# Patient Record
Sex: FEMALE | Race: WHITE | NOT HISPANIC OR LATINO | Employment: OTHER | URBAN - METROPOLITAN AREA
[De-identification: names, ages, dates, MRNs, and addresses within clinical notes are randomized per-mention and may not be internally consistent; named-entity substitution may affect disease eponyms.]

---

## 2019-11-12 ENCOUNTER — TELEPHONE (OUTPATIENT)
Dept: ORTHOPEDICS | Facility: CLINIC | Age: 69
End: 2019-11-12

## 2019-11-12 NOTE — TELEPHONE ENCOUNTER
Called both phone number documented in the chart. Called her regarding making her an appt to see dr. Cem hawley.

## 2019-11-19 ENCOUNTER — TELEPHONE (OUTPATIENT)
Dept: ORTHOPEDICS | Facility: CLINIC | Age: 69
End: 2019-11-19

## 2019-11-19 NOTE — TELEPHONE ENCOUNTER
Attempted to call patient at [phone] to get her schedule. No answer, left a voicemail to call us back.        ----- Message from Joelle Fisher PA-C sent at 11/19/2019  4:01 PM CST -----  Hey I found her number [phone]  ----- Message -----  From: Joelle Fisher PA-C  Sent: 11/19/2019   8:02 AM CST  To: Bernarda King MA    Can you call and schedule her we need to see her at [facility] so I can order the injections.   Thanks  ----- Message -----  From: Joelle Fisher PA-C  Sent: 10/10/2019  12:01 PM CST  To: Joelle Fisher PA-C    Call patient to schedule, make sure records are received.   She wants to restart visco

## 2019-11-19 NOTE — TELEPHONE ENCOUNTER
Attempted to call both phone numbers patient to get her scheduled with Dr. Cem Sarah or with Joelle. Both numbers had no answer and no voicemail.

## 2019-12-06 ENCOUNTER — TELEPHONE (OUTPATIENT)
Dept: ORTHOPEDICS | Facility: CLINIC | Age: 69
End: 2019-12-06

## 2019-12-06 NOTE — TELEPHONE ENCOUNTER
Attempted to call patient to get her scheduled with Joelle. No answer, left voicemail to call back to schedule.    ----- Message from Joelle Fisher PA-C sent at 12/6/2019  3:35 PM CST -----  Please contact patient and schedule for follow up

## 2020-09-14 ENCOUNTER — OFFICE VISIT (OUTPATIENT)
Dept: ORTHOPEDICS | Facility: CLINIC | Age: 70
End: 2020-09-14
Payer: MEDICARE

## 2020-09-14 ENCOUNTER — HOSPITAL ENCOUNTER (OUTPATIENT)
Dept: RADIOLOGY | Facility: HOSPITAL | Age: 70
Discharge: HOME | End: 2020-09-14
Attending: FAMILY MEDICINE
Payer: MEDICARE

## 2020-09-14 VITALS
DIASTOLIC BLOOD PRESSURE: 73 MMHG | HEIGHT: 67 IN | SYSTOLIC BLOOD PRESSURE: 134 MMHG | BODY MASS INDEX: 37.98 KG/M2 | HEART RATE: 71 BPM | WEIGHT: 242 LBS

## 2020-09-14 DIAGNOSIS — M25.572 LEFT ANKLE PAIN, UNSPECIFIED CHRONICITY: ICD-10-CM

## 2020-09-14 DIAGNOSIS — S93.402A SPRAIN OF LEFT ANKLE, UNSPECIFIED LIGAMENT, INITIAL ENCOUNTER: Primary | ICD-10-CM

## 2020-09-14 DIAGNOSIS — M25.572 LEFT ANKLE PAIN, UNSPECIFIED CHRONICITY: Primary | ICD-10-CM

## 2020-09-14 DIAGNOSIS — S86.311A STRAIN OF PERONEAL TENDON OF RIGHT FOOT, INITIAL ENCOUNTER: ICD-10-CM

## 2020-09-14 PROCEDURE — 99999 PR PBB SHADOW E&M-EST. PATIENT-LVL III: CPT | Mod: PBBFAC,,, | Performed by: FAMILY MEDICINE

## 2020-09-14 PROCEDURE — 99203 PR OFFICE/OUTPT VISIT, NEW, LEVL III, 30-44 MIN: ICD-10-PCS | Mod: S$PBB,,, | Performed by: FAMILY MEDICINE

## 2020-09-14 PROCEDURE — 99999 PR PBB SHADOW E&M-EST. PATIENT-LVL III: ICD-10-PCS | Mod: PBBFAC,,, | Performed by: FAMILY MEDICINE

## 2020-09-14 PROCEDURE — 99213 OFFICE O/P EST LOW 20 MIN: CPT | Mod: PBBFAC,25 | Performed by: FAMILY MEDICINE

## 2020-09-14 PROCEDURE — 99203 OFFICE O/P NEW LOW 30 MIN: CPT | Mod: S$PBB,,, | Performed by: FAMILY MEDICINE

## 2020-09-14 PROCEDURE — 73610 X-RAY EXAM OF ANKLE: CPT | Mod: TC,LT

## 2020-09-14 PROCEDURE — 73610 XR ANKLE COMPLETE 3 VIEW LEFT: ICD-10-PCS | Mod: 26,LT,, | Performed by: RADIOLOGY

## 2020-09-14 PROCEDURE — 73610 X-RAY EXAM OF ANKLE: CPT | Mod: 26,LT,, | Performed by: RADIOLOGY

## 2020-09-14 RX ORDER — EZETIMIBE 10 MG/1
10 TABLET ORAL DAILY
COMMUNITY
Start: 2020-09-11

## 2020-09-14 RX ORDER — VIT A/VIT C/VIT E/ZINC/COPPER 4296-226
1 CAPSULE ORAL WEEKLY
COMMUNITY

## 2020-09-14 RX ORDER — ANASTROZOLE 1 MG/1
1 TABLET ORAL DAILY
COMMUNITY
Start: 2020-03-02

## 2020-09-14 RX ORDER — ERGOCALCIFEROL 1.25 MG/1
1 CAPSULE ORAL
COMMUNITY
Start: 2020-02-10 | End: 2023-09-12

## 2020-09-14 RX ORDER — FENOFIBRATE 160 MG/1
160 TABLET ORAL DAILY
COMMUNITY
Start: 2020-08-25

## 2020-09-14 RX ORDER — MINERAL OIL
180 ENEMA (ML) RECTAL DAILY
COMMUNITY
Start: 2020-07-13

## 2020-09-14 RX ORDER — HYDROCHLOROTHIAZIDE 25 MG/1
25 TABLET ORAL DAILY
COMMUNITY
Start: 2020-07-25

## 2020-09-14 RX ORDER — LISINOPRIL 10 MG/1
10 TABLET ORAL DAILY
COMMUNITY
Start: 2020-09-11

## 2020-09-14 NOTE — PROGRESS NOTES
Subjective:     Patient ID: Jeana Monsivais is a 69 y.o. female.    Chief Complaint: Pain of the Left Foot and Pain of the Right Lower Leg      HPI:  Problem -left ankle sprain and right lower leg pain , patient tripped.  Was seen at another facility and told she might have a fracture and placed in walking boot but states it was too uncomfortable and she did not continue it more than a day or 2.  She has continued to have bruising but the swelling in the left foot is decreasing and the pain is better and 3.  She however has noticed pain in the lateral right lower leg and thinks that she strained that area at the same time of the initial fall.    Duration - 1 week    Severity -    Pain - 3   Limitations - no longer painful to bear weight for short distances.    Previous Treatment - boot.    Other Information - patient had Visco injections for knee last year which work well.      History reviewed. No pertinent past medical history.  Past Surgical History:   Procedure Laterality Date    BREAST LUMPECTOMY Left 2019    HYSTERECTOMY  1997     Family History   Problem Relation Age of Onset    Heart failure Mother     Heart failure Father     Hypertrophic cardiomyopathy Sister     Hypertrophic cardiomyopathy Brother      Social History     Socioeconomic History    Marital status:      Spouse name: Not on file    Number of children: Not on file    Years of education: Not on file    Highest education level: Not on file   Occupational History    Not on file   Social Needs    Financial resource strain: Not on file    Food insecurity     Worry: Not on file     Inability: Not on file    Transportation needs     Medical: Not on file     Non-medical: Not on file   Tobacco Use    Smoking status: Never Smoker    Smokeless tobacco: Never Used   Substance and Sexual Activity    Alcohol use: Not Currently    Drug use: Never    Sexual activity: Not Currently   Lifestyle    Physical activity     Days per week:  Not on file     Minutes per session: Not on file    Stress: Not on file   Relationships    Social connections     Talks on phone: Not on file     Gets together: Not on file     Attends Voodoo service: Not on file     Active member of club or organization: Not on file     Attends meetings of clubs or organizations: Not on file     Relationship status: Not on file   Other Topics Concern    Not on file   Social History Narrative    Not on file       Current Outpatient Medications:     anastrozole (ARIMIDEX) 1 mg Tab, Take 1 mg by mouth once daily., Disp: , Rfl:     ergocalciferol (ERGOCALCIFEROL) 50,000 unit Cap, Take 1 capsule by mouth., Disp: , Rfl:     ezetimibe (ZETIA) 10 mg tablet, Take 10 mg by mouth once daily., Disp: , Rfl:     fenofibrate 160 MG Tab, Take 160 mg by mouth once daily., Disp: , Rfl:     fexofenadine (ALLEGRA) 180 MG tablet, Take 180 mg by mouth once daily., Disp: , Rfl:     hydroCHLOROthiazide (HYDRODIURIL) 25 MG tablet, Take 25 mg by mouth once daily., Disp: , Rfl:     lisinopriL 10 MG tablet, Take 10 mg by mouth once daily., Disp: , Rfl:     vitamins  A,C,E-zinc-copper (PRESERVISION AREDS) 14,320-226-200 unit-mg-unit Cap, Take 1 tablet by mouth once a week., Disp: , Rfl:   Review of patient's allergies indicates:   Allergen Reactions    Erythromycin     Mycinette [cetylpyridinium-benzocaine] Diarrhea    Levaquin [levofloxacin] Nausea And Vomiting     Review of Systems   Constitutional: Negative for chills, fever and weight loss.   Respiratory: Negative for shortness of breath.    Cardiovascular: Negative for chest pain and palpitations.       Objective:   Body mass index is 38.47 kg/m².  Vitals:    09/14/20 1353   BP: 134/73   Pulse: 71           Ortho/SPM Exam  General - A&O, NAD.     Respiratory Effort - Normal    Extremity (Body Part) - left ankle     -No acute deformity/swelling-lateral malleolus swelling of 2+ with bruising of the ft.  No tenderness to palpation    ROM -  full    TTP - none    Stability -good and negative drawer sign    Strength - for 5    Neurovascular Intact -     Other -right lower leg-mild diffuse tenderness to palpation along the peroneus muscle with full range of motion and good strength.      Psychiatric - Affect & Cognition WNL      Plan for Improvement -patient has a pool which she will use daily to maintain flexibility and strength.  We will supply her with a lace-up ankle brace.    She should come in for recheck in 3 weeks if not healed by that time.        IMAGING: X-Ray Ankle Complete Left  Narrative: EXAMINATION:  XR ANKLE COMPLETE 3 VIEW LEFT    CLINICAL HISTORY:  Pain in left ankle and joints of left foot    TECHNIQUE:  AP, lateral and oblique views of the left ankle were performed.    COMPARISON:  None    FINDINGS:  No acute osseous or soft tissue abnormality.  Dorsal calcaneal enthesophyte formation noted.  Impression: As above    Electronically signed by: Aguilar Saul MD  Date:    09/14/2020  Time:    14:00       Radiographs / Imaging : Relevant imaging results reviewed by me and interpreted by me, discussed with the patient and / or family -radiographs few by me and no acute dislocation or fracture of the left ankle and agree with some degenerative changes and bones for noted in radiology report.    I reviewed the report from the patient's initial evaluation 1 week ago.  Patient does not desire to go to formal physical therapy so we went over some exercises for range of motion and also the ABC exercises that she can do for further strengthening and endurance of her ankle and proprioception.      Assessment:     Encounter Diagnoses   Name Primary?    Sprain of left ankle, unspecified ligament, initial encounter Yes    Strain of peroneal tendon of right foot, initial encounter         Plan:   Sprain of left ankle, unspecified ligament, initial encounter    Strain of peroneal tendon of right foot, initial encounter        The patient  verbalized good understanding of the medical issues discussed today and expressed appreciation for the care provided.  Patient was given the opportunity to ask questions and be an active participant in their medical care. Patient had no further questions or concerns at this time.     The patient was encouraged to participate in appropriate physical activity.      Roby Sarah M.D.  [facility]        This note was dictated using voice recognition software and may have sound a like errors.

## 2020-09-14 NOTE — PATIENT INSTRUCTIONS
Apply ice to affected area three times a day for (15) fifteen minutes for the next 48 hours, and reduce activity during that time.  Voltaren gel three times a day to affected area if recommended.  Oral medication if recommended.  Physical therapy if recommended at home or at clinic.  Keep recheck visit.    Lace-up ankle splint as needed for the next few weeks    Pool therapy daily or as available    Recheck in 3 weeks if still having soreness or pain

## 2020-10-26 ENCOUNTER — HOSPITAL ENCOUNTER (OUTPATIENT)
Dept: RADIOLOGY | Facility: HOSPITAL | Age: 70
Discharge: HOME | End: 2020-10-26
Attending: PHYSICIAN ASSISTANT
Payer: MEDICARE

## 2020-10-26 ENCOUNTER — OFFICE VISIT (OUTPATIENT)
Dept: ORTHOPEDICS | Facility: CLINIC | Age: 70
End: 2020-10-26
Payer: MEDICARE

## 2020-10-26 DIAGNOSIS — M25.561 RIGHT KNEE PAIN, UNSPECIFIED CHRONICITY: Primary | ICD-10-CM

## 2020-10-26 DIAGNOSIS — M17.11 PRIMARY OSTEOARTHRITIS OF RIGHT KNEE: Primary | ICD-10-CM

## 2020-10-26 DIAGNOSIS — M25.561 RIGHT KNEE PAIN, UNSPECIFIED CHRONICITY: ICD-10-CM

## 2020-10-26 PROCEDURE — 99213 PR OFFICE/OUTPT VISIT, EST, LEVL III, 20-29 MIN: ICD-10-PCS | Mod: S$GLB,,, | Performed by: PHYSICIAN ASSISTANT

## 2020-10-26 PROCEDURE — 73562 X-RAY EXAM OF KNEE 3: CPT | Mod: 26,59,LT, | Performed by: RADIOLOGY

## 2020-10-26 PROCEDURE — 73564 X-RAY EXAM KNEE 4 OR MORE: CPT | Mod: 26,RT,, | Performed by: RADIOLOGY

## 2020-10-26 PROCEDURE — 73562 XR KNEE ORTHO RIGHT WITH FLEXION: ICD-10-PCS | Mod: 26,59,LT, | Performed by: RADIOLOGY

## 2020-10-26 PROCEDURE — 73564 XR KNEE ORTHO RIGHT WITH FLEXION: ICD-10-PCS | Mod: 26,RT,, | Performed by: RADIOLOGY

## 2020-10-26 PROCEDURE — 99213 OFFICE O/P EST LOW 20 MIN: CPT | Mod: S$GLB,,, | Performed by: PHYSICIAN ASSISTANT

## 2020-10-26 PROCEDURE — 99999 PR PBB SHADOW E&M-EST. PATIENT-LVL II: ICD-10-PCS | Mod: PBBFAC,,, | Performed by: PHYSICIAN ASSISTANT

## 2020-10-26 PROCEDURE — 73562 X-RAY EXAM OF KNEE 3: CPT | Mod: TC,LT

## 2020-10-26 PROCEDURE — 99999 PR PBB SHADOW E&M-EST. PATIENT-LVL II: CPT | Mod: PBBFAC,,, | Performed by: PHYSICIAN ASSISTANT

## 2020-10-26 NOTE — PROGRESS NOTES
Patient ID: Jeana Monsivais  YOB: 1950  MRN: [id number]    Chief Complaint: Pain of the Right Knee    Referred By: Sister is current patient    History of Present Illness: Jeana Monsivais is a right-hand dominant 69 y.o. female who is retired. Patient is here today for right knee pain. Patient states her knee pain has been present for about 2 years. She denies any trauma or JIMMIE. She previously had  euflexxa injections [facility] ago, which she states helped at the time. She did formal therapy at [facility] for a Bakers Cyst 2 years ago as well. She denies any fever, chills, night sweats, numbness or tingling.  States that she was recently seen by Dr. Faisal Sarah for right ankle injury which she states that she is no longer having pain. Denies fevers, chills, night sweats, numbness and tingling.     Knee Pain   The pain is present in the right knee. This is a chronic problem. The current episode started more than 1 year ago. The problem occurs intermittently. The problem has been unchanged. The quality of the pain is described as tightness. The pain is at a severity of 1/10. Associated symptoms include joint swelling. Pertinent negatives include no fever, itching, joint locking, limited range of motion, numbness, stiffness or tingling. Exacerbated by: driving. She has tried injection treatment (last euflexxa 2 years ago) for the symptoms. Physical therapy was effective.      Past Medical History:   History reviewed. No pertinent past medical history.  Past Surgical History:   Procedure Laterality Date    BREAST LUMPECTOMY Left 2019    HYSTERECTOMY  1997     Family History   Problem Relation Age of Onset    Heart failure Mother     Heart failure Father     Hypertrophic cardiomyopathy Sister     Hypertrophic cardiomyopathy Brother      Social History     Socioeconomic History    Marital status:      Spouse name: Not on file    Number of children:  Not on file    Years of education: Not on file    Highest education level: Not on file   Occupational History    Not on file   Social Needs    Financial resource strain: Not on file    Food insecurity     Worry: Not on file     Inability: Not on file    Transportation needs     Medical: Not on file     Non-medical: Not on file   Tobacco Use    Smoking status: Never Smoker    Smokeless tobacco: Never Used   Substance and Sexual Activity    Alcohol use: Not Currently    Drug use: Never    Sexual activity: Not Currently   Lifestyle    Physical activity     Days per week: Not on file     Minutes per session: Not on file    Stress: Not on file   Relationships    Social connections     Talks on phone: Not on file     Gets together: Not on file     Attends Restorationist service: Not on file     Active member of club or organization: Not on file     Attends meetings of clubs or organizations: Not on file     Relationship status: Not on file   Other Topics Concern    Not on file   Social History Narrative    Not on file     Medication List with Changes/Refills   Current Medications    ANASTROZOLE (ARIMIDEX) 1 MG TAB    Take 1 mg by mouth once daily.    ERGOCALCIFEROL (ERGOCALCIFEROL) 50,000 UNIT CAP    Take 1 capsule by mouth.    EZETIMIBE (ZETIA) 10 MG TABLET    Take 10 mg by mouth once daily.    FENOFIBRATE 160 MG TAB    Take 160 mg by mouth once daily.    FEXOFENADINE (ALLEGRA) 180 MG TABLET    Take 180 mg by mouth once daily.    HYDROCHLOROTHIAZIDE (HYDRODIURIL) 25 MG TABLET    Take 25 mg by mouth once daily.    LISINOPRIL 10 MG TABLET    Take 10 mg by mouth once daily.    VITAMINS  A,C,E-ZINC-COPPER (PRESERVISION AREDS) 14,320-226-200 UNIT-MG-UNIT CAP    Take 1 tablet by mouth once a week.     Review of patient's allergies indicates:   Allergen Reactions    Erythromycin     Mycinette [cetylpyridinium-benzocaine] Diarrhea    Levaquin [levofloxacin] Nausea And Vomiting     Review of Systems   Constitution:  Negative for chills, fever and night sweats.   Cardiovascular: Negative for chest pain.   Respiratory: Negative for cough and shortness of breath.    Skin: Negative for itching and rash.   Musculoskeletal: Positive for joint pain. Negative for stiffness.   Gastrointestinal: Negative for nausea and vomiting.   Neurological: Negative for numbness and tingling.       Physical Exam:   There is no height or weight on file to calculate BMI.  There were no vitals filed for this visit.   GENERAL: Well appearing, appropriate for stated age, no acute distress.  CARDIOVASCULAR: Pulses regular by peripheral palpation.  PULMONARY: Respirations are even and non-labored.  NEURO: Awake, alert, and oriented x 3.  PSYCH: Mood & affect are appropriate.  HEENT: Head is normocephalic and atraumatic.  General    Nursing note and vitals reviewed.          Right Knee Exam     Tenderness   The patient is tender to palpation of the medial joint line.    Range of Motion   Extension: 0   Flexion: 120     Tests   Ligament Examination Lachman: normal (-1 to 2mm) PCL-Posterior Drawer: normal (0 to 2mm)     MCL - Valgus: normal (0 to 2mm)  LCL - Varus: normal    Other   Sensation: normal    Left Knee Exam   Left knee exam is normal.    Range of Motion   Extension: 0   Flexion: 120     Tests   Stability Lachman: normal (-1 to 2mm) PCL-Posterior Drawer: normal (0 to 2mm)  MCL - Valgus: normal (0 to 2mm)  LCL - Varus: normal (0 to 2mm)    Other   Sensation: normal    Muscle Strength   Right Lower Extremity   Hip Abduction: 5/5   Quadriceps:  5/5   Hamstrin/5   Left Lower Extremity   Hip Abduction: 5/5   Quadriceps:  5/5   Hamstrin/5     Vascular Exam     Right Pulses  Dorsalis Pedis:      2+  Posterior Tibial:      2+        Left Pulses  Dorsalis Pedis:      2+  Posterior Tibial:      2+          Intact EHL, FHL, gastrocsoleus, and tibialis anterior.   Sensation intact to light touch in superficial peroneal, deep peroneal, tibial, sural,  and saphenous nerve distributions.   Foot warm and well perfused with capillary refill of less than 2 seconds and palpable pedal pulses.      Imaging:    X-ray Knee Ortho Right with Flexion  Narrative: EXAMINATION:  XR KNEE ORTHO RIGHT WITH FLEXION    CLINICAL HISTORY:  Pain in right knee    TECHNIQUE:  AP standing as well as PA flexion standing and Merchant views of both knees were performed.  A lateral view of the right knee is also performed.    COMPARISON:  None.    FINDINGS:  There is a minimally displaced healing fracture of the proximal right fibular shaft.  Mild tricompartmental osteoarthritis of the right knee is noted.  There is no right knee effusion.    Limited evaluation of the left knee demonstrates mild tricompartmental osteoarthritis without acute abnormality.  Impression: 1.  Minimally displaced healing right proximal fibular shaft fracture.    2.  Bilateral knee osteoarthritis.    Electronically signed by: Jv Betancourt  Date:    10/26/2020  Time:    17:17    Radiographic images show mild tricompartmental OA with left healing proximal fibular fracture.  Relevant imaging results reviewed and interpreted by me, discussed with the patient and / or family today.     Other Tests:     No other tests performed today.  Patient is an Visco injections in the past with improvement.  It has been several months since her last series of injections.  She was a previous patient of [facility] as notice significant improvement with all injections.  Discussed the patient's radiographic graphic results over the phone with her that afternoon.  The patient will follow up next week for tib-fib x-rays    Assessment:  Jeana Monsivais is a 69 y.o. female who is retired, presents with chronic right knee pain that has been present for about 2 years   OA Right Knee- tricompartmental   Right healing proximal fibula fracture    Encounter Diagnosis   Name Primary?    Primary osteoarthritis of right knee Yes       Plan:   MEDICAL NECESSITY FOR VISCOSUPPLEMENTATION: After thorough evaluation of the patient, I have determined that visco-supplementation is medically necessary. The patient has painful DJD of the knee with failure of conservative therapy including lifestyle modifications and rehabilitation exercises. Oral analgesis/NSAIDs have not adequately controlled symptoms and there is radiographic evidence of joint space narrowing, subchondral sclerosis, and some early osteophytic changes, or in lack of radiographic evidence, there is arthroscopic or other evidence of chondrosis.   Start Visco in 4 weeks    I discussed worrisome and red flag signs and symptoms with the patient. The patient expressed understanding and agreed to alert me immediately or to go to the emergency room if they experience any of these.    Treatment plan was developed with input from the patient/family, and they expressed understanding and agreement with the plan. All questions were answered today.    Follow-up:  4 weeks VISCO  or sooner if there are any problems between now and then.    Disclaimer: This note was prepared using a voice recognition system and is likely to have sound alike errors within the text.

## 2020-10-26 NOTE — PATIENT INSTRUCTIONS
Assessment:  Jeana Monsivais is a 69 y.o. female  who is retired, presents with chronic right knee pain that has been present for about 2 years   OA Right Knee- tricompartmental   Right healing proximal fibula fracture    Encounter Diagnosis   Name Primary?    Primary osteoarthritis of right knee Yes        Plan:   4 weeks with visco right knee      Follow-up: 4 weeks with visco or sooner if there are any problems between now and then.    Thank you for choosing [facility] and Dr. Cem Sarah for your orthopedic & sports medicine care. It is our goal to provide you with exceptional care that will help keep you healthy, active, and get you back in the game.    If you felt that you received exemplary care today, please consider leaving us feedback on [employer] at [email or web address].     Please do not hesitate to reach out to us via email, phone, or MyChart with any questions, concerns, or feedback.    If you are experiencing pain/discomfort ,or have questions after 5pm and would like to be connected to the [facility] on-call team, please call this number and specify which Sports Medicine provider is treating you: [phone]

## 2020-10-27 ENCOUNTER — TELEPHONE (OUTPATIENT)
Dept: ORTHOPEDICS | Facility: CLINIC | Age: 70
End: 2020-10-27

## 2020-10-27 NOTE — TELEPHONE ENCOUNTER
Returned patient's call. Patient states that Joelle was going to call her to let her know what the results of her xrays are. Patient states that she already knows what is going on with her knee and she doesn't need a message sent to Joelle.     ----- Message from Tenisha Toro sent at 10/27/2020  2:29 PM CDT -----  Contact: self/[phone]  Type:  Patient Returning Call    Who Called:Jeana Monsivais  Who Left Message for Patient:nurse  Does the patient know what this is regarding?:results  Would the patient rather a call back or a response via [facility]? Call back   Best Call Back Number:[phone]  Additional Information:

## 2020-10-28 ENCOUNTER — TELEPHONE (OUTPATIENT)
Dept: ORTHOPEDICS | Facility: CLINIC | Age: 70
End: 2020-10-28

## 2020-10-28 DIAGNOSIS — S82.201A CLOSED FRACTURE OF RIGHT TIBIA AND FIBULA, INITIAL ENCOUNTER: Primary | ICD-10-CM

## 2020-10-28 DIAGNOSIS — S82.401A CLOSED FRACTURE OF RIGHT TIBIA AND FIBULA, INITIAL ENCOUNTER: Primary | ICD-10-CM

## 2020-11-02 ENCOUNTER — OFFICE VISIT (OUTPATIENT)
Dept: ORTHOPEDICS | Facility: CLINIC | Age: 70
End: 2020-11-02
Payer: MEDICARE

## 2020-11-02 ENCOUNTER — HOSPITAL ENCOUNTER (OUTPATIENT)
Dept: RADIOLOGY | Facility: HOSPITAL | Age: 70
Discharge: HOME | End: 2020-11-02
Attending: PHYSICIAN ASSISTANT
Payer: MEDICARE

## 2020-11-02 VITALS
HEART RATE: 82 BPM | SYSTOLIC BLOOD PRESSURE: 122 MMHG | WEIGHT: 242 LBS | BODY MASS INDEX: 38.89 KG/M2 | HEIGHT: 66 IN | DIASTOLIC BLOOD PRESSURE: 63 MMHG

## 2020-11-02 DIAGNOSIS — S82.201A CLOSED FRACTURE OF RIGHT TIBIA AND FIBULA, INITIAL ENCOUNTER: ICD-10-CM

## 2020-11-02 DIAGNOSIS — S82.831D CLOSED FRACTURE OF PROXIMAL END OF RIGHT FIBULA WITH ROUTINE HEALING, UNSPECIFIED FRACTURE MORPHOLOGY, SUBSEQUENT ENCOUNTER: Primary | ICD-10-CM

## 2020-11-02 DIAGNOSIS — S82.401A CLOSED FRACTURE OF RIGHT TIBIA AND FIBULA, INITIAL ENCOUNTER: ICD-10-CM

## 2020-11-02 PROCEDURE — 99213 PR OFFICE/OUTPT VISIT, EST, LEVL III, 20-29 MIN: ICD-10-PCS | Mod: S$GLB,,, | Performed by: PHYSICIAN ASSISTANT

## 2020-11-02 PROCEDURE — 73590 X-RAY EXAM OF LOWER LEG: CPT | Mod: TC,RT

## 2020-11-02 PROCEDURE — 73590 XR TIBIA FIBULA 2 VIEW RIGHT: ICD-10-PCS | Mod: 26,RT,, | Performed by: RADIOLOGY

## 2020-11-02 PROCEDURE — 99213 OFFICE O/P EST LOW 20 MIN: CPT | Mod: S$GLB,,, | Performed by: PHYSICIAN ASSISTANT

## 2020-11-02 PROCEDURE — 99999 PR PBB SHADOW E&M-EST. PATIENT-LVL III: CPT | Mod: PBBFAC,,, | Performed by: PHYSICIAN ASSISTANT

## 2020-11-02 PROCEDURE — 73590 X-RAY EXAM OF LOWER LEG: CPT | Mod: 26,RT,, | Performed by: RADIOLOGY

## 2020-11-02 PROCEDURE — 99999 PR PBB SHADOW E&M-EST. PATIENT-LVL III: ICD-10-PCS | Mod: PBBFAC,,, | Performed by: PHYSICIAN ASSISTANT

## 2020-11-02 NOTE — PROGRESS NOTES
Patient ID: Jeana Monsivais  YOB: 1950  MRN: [id number]    Chief Complaint: Pain of the Right Lower Leg    Referred By: sister is a current patient     History of Present Illness: Jeana Monsivais is a right-hand dominant 69 y.o. 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 She is here today for her right leg. She states her pain is 0/10 today. Patient states that she fell on 09/09/2020. She fell while walking out of her back door and was carrying too much. She didn't realize she had an injury. She felt that it was uncomfortable but not unbearable. She was more concerned about the injury to her left foot.  She followed up with Dr. Roby Sarah on 09/14/2020 for left ankle sprain.  States that her left ankle was more for concern her right leg on her calf did her but not as bad as her ankle.  She states that she has been weight-bearing on her both legs.  She has no complaints any more of her left ankle.  She denies any symptomatic symptoms of fever, chills, night sweats, numbness and tingling.    Previous 10/26/2020 History of Present Illness: Jeana Monsivais is a right-hand dominant 69 y.o. female who is retired. Patient is here today for right knee pain. Patient states her knee pain has been present for about 2 years. She denies any trauma or JIMMIE. She previously had  euflexxa [facility], which she states helped at the time. She did formal therapy at [place] off [facility] for a Bakers Cyst 2 years ago as well. She denies any fever, chills, night sweats, numbness or tingling.  States that she was recently seen by Dr. Faisal Sarah for right ankle injury which she states that she is no longer having pain. Denies fevers, chills, night sweats, numbness and tingling.   Leg Pain   The pain is present in the other (right leg ). This is a new problem. The problem occurs rarely. The problem has been unchanged. The quality of the pain is described as aching and dull. The  pain is at a severity of 0/10. Associated symptoms include an inability to bear weight and stiffness. Pertinent negatives include no fever or itching. The symptoms are aggravated by activity and walking. She has tried cold and NSAIDs for the symptoms. The treatment provided no relief. Physical therapy was not tried.      Past Medical History:   History reviewed. No pertinent past medical history.  Past Surgical History:   Procedure Laterality Date    BREAST LUMPECTOMY Left 2019    HYSTERECTOMY  1997     Family History   Problem Relation Age of Onset    Heart failure Mother     Heart failure Father     Hypertrophic cardiomyopathy Sister     Hypertrophic cardiomyopathy Brother      Social History     Socioeconomic History    Marital status:      Spouse name: Not on file    Number of children: Not on file    Years of education: Not on file    Highest education level: Not on file   Occupational History    Not on file   Social Needs    Financial resource strain: Not on file    Food insecurity     Worry: Not on file     Inability: Not on file    Transportation needs     Medical: Not on file     Non-medical: Not on file   Tobacco Use    Smoking status: Never Smoker    Smokeless tobacco: Never Used   Substance and Sexual Activity    Alcohol use: Not Currently    Drug use: Never    Sexual activity: Not Currently   Lifestyle    Physical activity     Days per week: Not on file     Minutes per session: Not on file    Stress: Not on file   Relationships    Social connections     Talks on phone: Not on file     Gets together: Not on file     Attends Buddhist service: Not on file     Active member of club or organization: Not on file     Attends meetings of clubs or organizations: Not on file     Relationship status: Not on file   Other Topics Concern    Not on file   Social History Narrative    Not on file     Medication List with Changes/Refills   Current Medications    ANASTROZOLE (ARIMIDEX) 1  MG TAB    Take 1 mg by mouth once daily.    ERGOCALCIFEROL (ERGOCALCIFEROL) 50,000 UNIT CAP    Take 1 capsule by mouth.    EZETIMIBE (ZETIA) 10 MG TABLET    Take 10 mg by mouth once daily.    FENOFIBRATE 160 MG TAB    Take 160 mg by mouth once daily.    FEXOFENADINE (ALLEGRA) 180 MG TABLET    Take 180 mg by mouth once daily.    HYDROCHLOROTHIAZIDE (HYDRODIURIL) 25 MG TABLET    Take 25 mg by mouth once daily.    LISINOPRIL 10 MG TABLET    Take 10 mg by mouth once daily.    VITAMINS  A,C,E-ZINC-COPPER (PRESERVISION AREDS) 14,[id number] UNIT-MG-UNIT CAP    Take 1 tablet by mouth once a week.     Review of patient's allergies indicates:   Allergen Reactions    Erythromycin     Mycinette [cetylpyridinium-benzocaine] Diarrhea    Levaquin [levofloxacin] Nausea And Vomiting     Review of Systems   Constitution: Negative for fever.   HENT: Negative for sore throat.    Eyes: Negative for blurred vision.   Cardiovascular: Negative for dyspnea on exertion.   Respiratory: Negative for shortness of breath.    Hematologic/Lymphatic: Does not bruise/bleed easily.   Skin: Negative for itching.   Musculoskeletal: Positive for joint pain, joint swelling, muscle cramps, muscle weakness and stiffness.   Gastrointestinal: Negative for vomiting.   Genitourinary: Negative for dysuria.   Neurological: Negative for dizziness and loss of balance.   Psychiatric/Behavioral: The patient does not have insomnia.        Physical Exam:   Body mass index is 39.06 kg/m².  Vitals:    11/02/20 1139   BP: 122/63   Pulse: 82      GENERAL: Well appearing, appropriate for stated age, no acute distress.  CARDIOVASCULAR: Pulses regular by peripheral palpation.  PULMONARY: Respirations are even and non-labored.  NEURO: Awake, alert, and oriented x 3.  PSYCH: Mood & affect are appropriate.  HEENT: Head is normocephalic and atraumatic.  General    Nursing note and vitals reviewed.          Right Knee Exam     Tenderness   The patient is tender to palpation  of the medial joint line (Lateral sided pain, over fracture site.).    Range of Motion   The patient has normal right knee ROM.  Extension: 0   Flexion: 120     Tests   Meniscus   Anton:  Medial - negative Lateral - negative  Ligament Examination Lachman: normal (-1 to 2mm) PCL-Posterior Drawer: normal (0 to 2mm)     MCL - Valgus: normal (0 to 2mm)  LCL - Varus: normal  Patella   Patellar apprehension: negative    Other   Sensation: normal    Comments:  Slight pain over the lateral compartment over fibula.  No bruising no edema.     Left Knee Exam     Range of Motion   The patient has normal left knee ROM.  Extension: 0   Flexion: 120     Tests   Meniscus   Anton:  Medial - negative Lateral - negative  Stability Lachman: normal (-1 to 2mm) PCL-Posterior Drawer: normal (0 to 2mm)  MCL - Valgus: normal (0 to 2mm)  LCL - Varus: normal (0 to 2mm)  Patella   Patellar apprehension: negative    Other   Sensation: normal    Muscle Strength   Right Lower Extremity   Hip Abduction: 5/5   Quadriceps:  5/5   Hamstrin/5   Left Lower Extremity   Hip Abduction: 5/5   Quadriceps:  5/5   Hamstrin/5     Vascular Exam     Right Pulses  Dorsalis Pedis:      2+  Posterior Tibial:      2+        Left Pulses  Dorsalis Pedis:      2+  Posterior Tibial:      2+          Intact EHL, FHL, gastrocsoleus, and tibialis anterior.   Sensation intact to light touch in superficial peroneal, deep peroneal, tibial, sural, and saphenous nerve distributions.   Foot warm and well perfused with capillary refill of less than 2 seconds and palpable pedal pulses.    Imaging:    X-Ray Tibia Fibula 2 View Right  Narrative: EXAMINATION:  XR TIBIA FIBULA 2 VIEW RIGHT    CLINICAL HISTORY:  Unspecified fracture of shaft of right tibia, initial encounter for closed fracture    TECHNIQUE:  AP and lateral views of the right tibia and fibula were performed.    COMPARISON:  10/26/2020    FINDINGS:  There is a partially healed minimally displaced  fracture deformity of the proximal fibular diaphysis with some bridging marginal osseous callus present.  Findings appear unchanged from prior.  There also appears to be some mild periosteal reaction along the medial margins of the adjacent proximal tibial diaphysis without appreciable associated fracture.  Findings appear unchanged from prior.  Mild degenerative findings noted at the right knee.  Impression: As above    Electronically signed by: Xavier Worrell MD  Date:    11/02/2020  Time:    12:23    New radiographic images were taken today of right tibia which shows a healing displaced fracture of the proximal fibula with calcification present  Relevant imaging results reviewed and interpreted by me, discussed with the patient and / or family today.     Other Tests:     No other tests performed today.  Patient had an injury back in early September, states that the time that was hurting has improved with pain.  Patient was did know about fracture and so she was recently had x-rays to restart Visco injections, and the fracture were seen well on knee x-rays.  At the patient come back in in follow-up for new tibia x-rays.  Will continue to follow fracture healing.  If we feel like the fracture is not healing properly we will get a CT scan.  At this time patient can continue weight-bearing and activities as tolerated.  With no restrictions will start Visco in 3 weeks.  Kellgren Son score >2    Assessment:  Jeana Monsivais is a 69 y.o. female previous patient [facility], recently seen for right knee pain and right knee osteoarthritis to restart Visco injections.  Incidental finding of right fibular fracture which occurred back in early September during a fall.   Right healing fibular fracture    Encounter Diagnosis   Name Primary?    Closed fracture of proximal end of right fibula with routine healing, unspecified fracture morphology, subsequent encounter Yes        Plan:  · Recheck in 3 weeks on 11/23  with tib fib x-rays on return   · I discussed worrisome and red flag signs and symptoms with the patient. The patient expressed understanding and agreed to alert me immediately or to go to the emergency room if they experience any of these.   · MEDICAL NECESSITY FOR VISCOSUPPLEMENTATION: After thorough evaluation of the patient, I have determined that visco-supplementation is medically necessary. The patient has painful DJD of the knee with failure of conservative therapy including lifestyle modifications and rehabilitation exercises. Oral analgesis/NSAIDs have not adequately controlled symptoms and there is radiographic evidence of joint space narrowing, subchondral sclerosis, and some early osteophytic changes, or in lack of radiographic evidence, there is arthroscopic or other evidence of chondrosis.   Treatment plan was developed with input from the patient/family, and they expressed understanding and agreement with the plan. All questions were answered today.    Follow-up:  3 weeks w/ x-rays on return or sooner if there are any problems between now and then.    Disclaimer: This note was prepared using a voice recognition system and is likely to have sound alike errors within the text.

## 2020-11-02 NOTE — PATIENT INSTRUCTIONS
Assessment:  Jeana Monsivais is a 69 y.o. female previous patient [facility], recently seen for right knee pain and right knee osteoarthritis to restart Visco injections.  Incidental finding of right fibular fracture which occurred back in early September during a fall.   Right healing fibular fracture    Encounter Diagnosis   Name Primary?    Closed fracture of proximal end of right fibula with routine healing, unspecified fracture morphology, subsequent encounter Yes        Plan:   Recheck in 3 weeks on 11/23 with tib fib x-rays on return       Follow-up: 3 weeks or sooner if there are any problems between now and then.    Thank you for choosing [facility] and Dr. Cem Sarah for your orthopedic & sports medicine care. It is our goal to provide you with exceptional care that will help keep you healthy, active, and get you back in the game.    If you felt that you received exemplary care today, please consider leaving us feedback on [employer] at [email or web address].     Please do not hesitate to reach out to us via email, phone, or MyChart with any questions, concerns, or feedback.    If you are experiencing pain/discomfort ,or have questions after 5pm and would like to be connected to the [facility] on-call team, please call this number and specify which Sports Medicine provider is treating you: [phone]

## 2020-11-23 ENCOUNTER — PROCEDURE VISIT (OUTPATIENT)
Dept: ORTHOPEDICS | Facility: CLINIC | Age: 70
End: 2020-11-23
Payer: MEDICARE

## 2020-11-23 VITALS
HEART RATE: 81 BPM | SYSTOLIC BLOOD PRESSURE: 157 MMHG | DIASTOLIC BLOOD PRESSURE: 84 MMHG | WEIGHT: 242 LBS | HEIGHT: 66 IN | BODY MASS INDEX: 38.89 KG/M2

## 2020-11-23 DIAGNOSIS — M17.11 PRIMARY OSTEOARTHRITIS OF RIGHT KNEE: Primary | ICD-10-CM

## 2020-11-23 PROCEDURE — 20610 DRAIN/INJ JOINT/BURSA W/O US: CPT | Mod: PBBFAC | Performed by: PHYSICIAN ASSISTANT

## 2020-11-23 PROCEDURE — 99499 NO LOS: ICD-10-PCS | Mod: S$PBB,,, | Performed by: PHYSICIAN ASSISTANT

## 2020-11-23 PROCEDURE — 20610 DRAIN/INJ JOINT/BURSA W/O US: CPT | Mod: S$PBB,RT,, | Performed by: PHYSICIAN ASSISTANT

## 2020-11-23 PROCEDURE — 20610 LARGE JOINT ASPIRATION/INJECTION: R KNEE: ICD-10-PCS | Mod: S$PBB,RT,, | Performed by: PHYSICIAN ASSISTANT

## 2020-11-23 PROCEDURE — 99499 UNLISTED E&M SERVICE: CPT | Mod: S$PBB,,, | Performed by: PHYSICIAN ASSISTANT

## 2020-11-23 RX ADMIN — Medication 20 MG: at 01:11

## 2020-11-23 NOTE — PROCEDURES
Large Joint Aspiration/Injection: R knee    Date/Time: 11/23/2020 1:40 PM  Performed by: Joelle Fisher PA-C  Authorized by: Joelle Fisher PA-C     Consent Done?:  Yes (Verbal)  Indications:  Joint swelling and pain  Site marked: the procedure site was marked    Timeout: prior to procedure the correct patient, procedure, and site was verified    Prep: patient was prepped and draped in usual sterile fashion      Local anesthesia used?: Yes    Local anesthetic:  Topical anesthetic    Details:  Needle Size:  22 G  Ultrasonic Guidance for needle placement?: No    Approach:  Superior  Location:  Knee  Site:  R knee  Medications:  20 mg sodium hyaluronate (EUFLEXXA) 10 mg/mL(mw 2.4 -3.6 million)  Patient tolerance:  Patient tolerated the procedure well with no immediate complications     Injection 1/3

## 2020-11-30 ENCOUNTER — TELEPHONE (OUTPATIENT)
Dept: ORTHOPEDICS | Facility: CLINIC | Age: 70
End: 2020-11-30

## 2020-11-30 DIAGNOSIS — S82.831D CLOSED FRACTURE OF PROXIMAL END OF RIGHT FIBULA WITH ROUTINE HEALING, UNSPECIFIED FRACTURE MORPHOLOGY, SUBSEQUENT ENCOUNTER: Primary | ICD-10-CM

## 2020-11-30 NOTE — TELEPHONE ENCOUNTER
Called pt back to reschedule her injection apt to dr. Paulino due to provider being out, pt understood.               ----- Message from Ericka Prince sent at 11/30/2020 12:06 PM CST -----  Contact: zhanna  Pt returned missed call in regards to rescheduling appt w/Joelle Fisher. States she needs xray to be rescheduled to the same day. Please call back at [phone]. thanks jh

## 2020-12-02 ENCOUNTER — PROCEDURE VISIT (OUTPATIENT)
Dept: ORTHOPEDICS | Facility: CLINIC | Age: 70
End: 2020-12-02
Payer: MEDICARE

## 2020-12-02 ENCOUNTER — HOSPITAL ENCOUNTER (OUTPATIENT)
Dept: RADIOLOGY | Facility: HOSPITAL | Age: 70
Discharge: HOME | End: 2020-12-02
Attending: PHYSICIAN ASSISTANT
Payer: MEDICARE

## 2020-12-02 VITALS
SYSTOLIC BLOOD PRESSURE: 145 MMHG | WEIGHT: 242 LBS | BODY MASS INDEX: 38.89 KG/M2 | DIASTOLIC BLOOD PRESSURE: 58 MMHG | HEIGHT: 66 IN | HEART RATE: 90 BPM

## 2020-12-02 DIAGNOSIS — S82.201A CLOSED FRACTURE OF RIGHT TIBIA AND FIBULA, INITIAL ENCOUNTER: ICD-10-CM

## 2020-12-02 DIAGNOSIS — M17.11 PRIMARY OSTEOARTHRITIS OF RIGHT KNEE: Primary | ICD-10-CM

## 2020-12-02 DIAGNOSIS — M25.572 LEFT ANKLE PAIN, UNSPECIFIED CHRONICITY: ICD-10-CM

## 2020-12-02 DIAGNOSIS — S82.401A CLOSED FRACTURE OF RIGHT TIBIA AND FIBULA, INITIAL ENCOUNTER: ICD-10-CM

## 2020-12-02 DIAGNOSIS — S82.831D CLOSED FRACTURE OF PROXIMAL END OF RIGHT FIBULA WITH ROUTINE HEALING, UNSPECIFIED FRACTURE MORPHOLOGY, SUBSEQUENT ENCOUNTER: ICD-10-CM

## 2020-12-02 PROCEDURE — 73590 XR TIBIA FIBULA 2 VIEW RIGHT: ICD-10-PCS | Mod: 26,RT,, | Performed by: RADIOLOGY

## 2020-12-02 PROCEDURE — 99499 UNLISTED E&M SERVICE: CPT | Mod: S$PBB,,, | Performed by: PHYSICAL MEDICINE & REHABILITATION

## 2020-12-02 PROCEDURE — 73590 X-RAY EXAM OF LOWER LEG: CPT | Mod: 26,RT,, | Performed by: RADIOLOGY

## 2020-12-02 PROCEDURE — 99499 NO LOS: ICD-10-PCS | Mod: S$PBB,,, | Performed by: PHYSICAL MEDICINE & REHABILITATION

## 2020-12-02 PROCEDURE — 20610 DRAIN/INJ JOINT/BURSA W/O US: CPT | Mod: PBBFAC | Performed by: PHYSICAL MEDICINE & REHABILITATION

## 2020-12-02 PROCEDURE — 20610 LARGE JOINT ASPIRATION/INJECTION: R KNEE: ICD-10-PCS | Mod: S$PBB,RT,, | Performed by: PHYSICAL MEDICINE & REHABILITATION

## 2020-12-02 PROCEDURE — 73590 X-RAY EXAM OF LOWER LEG: CPT | Mod: TC,RT

## 2020-12-02 RX ADMIN — Medication 20 MG: at 02:12

## 2020-12-02 NOTE — PROCEDURES
Large Joint Aspiration/Injection: R knee    Date/Time: 12/2/2020 2:30 PM  Performed by: Shweta Paulino MD  Authorized by: Shweta Paulino MD     Consent Done?:  Yes (Verbal)  Indications:  Joint swelling and pain  Site marked: the procedure site was marked    Timeout: prior to procedure the correct patient, procedure, and site was verified    Prep: patient was prepped and draped in usual sterile fashion      Local anesthesia used?: Yes    Local anesthetic:  Topical anesthetic    Details:  Needle Size:  22 G  Ultrasonic Guidance for needle placement?: No    Approach:  Superior  Location:  Knee  Site:  R knee  Medications:  20 mg sodium hyaluronate (EUFLEXXA) 10 mg/mL(mw 2.4 -3.6 million)  Patient tolerance:  Patient tolerated the procedure well with no immediate complications

## 2020-12-09 ENCOUNTER — OFFICE VISIT (OUTPATIENT)
Dept: ORTHOPEDICS | Facility: CLINIC | Age: 70
End: 2020-12-09
Payer: MEDICARE

## 2020-12-09 VITALS
WEIGHT: 242 LBS | BODY MASS INDEX: 38.89 KG/M2 | DIASTOLIC BLOOD PRESSURE: 79 MMHG | HEART RATE: 81 BPM | HEIGHT: 66 IN | SYSTOLIC BLOOD PRESSURE: 147 MMHG

## 2020-12-09 DIAGNOSIS — M25.561 RIGHT KNEE PAIN, UNSPECIFIED CHRONICITY: ICD-10-CM

## 2020-12-09 DIAGNOSIS — M17.11 PRIMARY OSTEOARTHRITIS OF RIGHT KNEE: Primary | ICD-10-CM

## 2020-12-09 PROCEDURE — 99999 PR PBB SHADOW E&M-EST. PATIENT-LVL III: CPT | Mod: PBBFAC,,, | Performed by: PHYSICAL MEDICINE & REHABILITATION

## 2020-12-09 PROCEDURE — 99213 OFFICE O/P EST LOW 20 MIN: CPT | Mod: PBBFAC | Performed by: PHYSICAL MEDICINE & REHABILITATION

## 2020-12-09 PROCEDURE — 99999 PR PBB SHADOW E&M-EST. PATIENT-LVL III: ICD-10-PCS | Mod: PBBFAC,,, | Performed by: PHYSICAL MEDICINE & REHABILITATION

## 2020-12-09 PROCEDURE — 99499 NO LOS: ICD-10-PCS | Mod: S$PBB,,, | Performed by: PHYSICAL MEDICINE & REHABILITATION

## 2020-12-09 PROCEDURE — 99499 UNLISTED E&M SERVICE: CPT | Mod: S$PBB,,, | Performed by: PHYSICAL MEDICINE & REHABILITATION

## 2020-12-09 PROCEDURE — 20610 LARGE JOINT ASPIRATION/INJECTION: R KNEE: ICD-10-PCS | Mod: S$PBB,RT,, | Performed by: PHYSICAL MEDICINE & REHABILITATION

## 2020-12-09 PROCEDURE — 20610 DRAIN/INJ JOINT/BURSA W/O US: CPT | Mod: PBBFAC | Performed by: PHYSICAL MEDICINE & REHABILITATION

## 2020-12-09 RX ADMIN — Medication 20 MG: at 02:12

## 2020-12-09 NOTE — PROCEDURES
Large Joint Aspiration/Injection: R knee    Date/Time: 12/9/2020 2:00 PM  Performed by: Shweta Paulino MD  Authorized by: Shweta Paulino MD     Consent Done?:  Yes (Verbal)  Indications:  Joint swelling and pain  Site marked: the procedure site was marked    Timeout: prior to procedure the correct patient, procedure, and site was verified    Prep: patient was prepped and draped in usual sterile fashion      Local anesthesia used?: Yes    Local anesthetic:  Topical anesthetic    Details:  Needle Size:  22 G  Ultrasonic Guidance for needle placement?: No    Approach:  Superior  Location:  Knee  Site:  R knee  Medications:  20 mg sodium hyaluronate (EUFLEXXA) 10 mg/mL(mw 2.4 -3.6 million)  Patient tolerance:  Patient tolerated the procedure well with no immediate complications

## 2021-01-20 ENCOUNTER — TELEPHONE (OUTPATIENT)
Dept: ORTHOPEDICS | Facility: CLINIC | Age: 71
End: 2021-01-20

## 2021-01-20 ENCOUNTER — PATIENT MESSAGE (OUTPATIENT)
Dept: ORTHOPEDICS | Facility: CLINIC | Age: 71
End: 2021-01-20

## 2021-02-11 ENCOUNTER — OFFICE VISIT (OUTPATIENT)
Dept: ORTHOPEDICS | Facility: CLINIC | Age: 71
End: 2021-02-11
Payer: MEDICARE

## 2021-02-11 DIAGNOSIS — S82.401A CLOSED FRACTURE OF RIGHT TIBIA AND FIBULA, INITIAL ENCOUNTER: Primary | ICD-10-CM

## 2021-02-11 DIAGNOSIS — S82.201A CLOSED FRACTURE OF RIGHT TIBIA AND FIBULA, INITIAL ENCOUNTER: Primary | ICD-10-CM

## 2021-02-11 DIAGNOSIS — M17.11 PRIMARY OSTEOARTHRITIS OF RIGHT KNEE: ICD-10-CM

## 2021-02-11 PROCEDURE — 99213 OFFICE O/P EST LOW 20 MIN: CPT | Mod: S$PBB,,, | Performed by: PHYSICIAN ASSISTANT

## 2021-02-11 PROCEDURE — 99999 PR PBB SHADOW E&M-EST. PATIENT-LVL III: CPT | Mod: PBBFAC,,, | Performed by: PHYSICIAN ASSISTANT

## 2021-02-11 PROCEDURE — 99213 PR OFFICE/OUTPT VISIT, EST, LEVL III, 20-29 MIN: ICD-10-PCS | Mod: S$PBB,,, | Performed by: PHYSICIAN ASSISTANT

## 2021-02-11 PROCEDURE — 99213 OFFICE O/P EST LOW 20 MIN: CPT | Mod: PBBFAC | Performed by: PHYSICIAN ASSISTANT

## 2021-02-11 PROCEDURE — 99999 PR PBB SHADOW E&M-EST. PATIENT-LVL III: ICD-10-PCS | Mod: PBBFAC,,, | Performed by: PHYSICIAN ASSISTANT

## 2021-04-15 ENCOUNTER — OFFICE VISIT (OUTPATIENT)
Dept: ORTHOPEDICS | Facility: CLINIC | Age: 71
End: 2021-04-15
Payer: MEDICARE

## 2021-04-15 ENCOUNTER — HOSPITAL ENCOUNTER (OUTPATIENT)
Dept: RADIOLOGY | Facility: HOSPITAL | Age: 71
Discharge: HOME | End: 2021-04-15
Attending: PHYSICIAN ASSISTANT
Payer: MEDICARE

## 2021-04-15 DIAGNOSIS — S82.201D CLOSED FRACTURE OF RIGHT TIBIA AND FIBULA WITH ROUTINE HEALING, SUBSEQUENT ENCOUNTER: Primary | ICD-10-CM

## 2021-04-15 DIAGNOSIS — S82.401D CLOSED FRACTURE OF RIGHT TIBIA AND FIBULA WITH ROUTINE HEALING, SUBSEQUENT ENCOUNTER: Primary | ICD-10-CM

## 2021-04-15 DIAGNOSIS — S82.201A CLOSED FRACTURE OF RIGHT TIBIA AND FIBULA, INITIAL ENCOUNTER: ICD-10-CM

## 2021-04-15 DIAGNOSIS — S82.401A CLOSED FRACTURE OF RIGHT TIBIA AND FIBULA, INITIAL ENCOUNTER: ICD-10-CM

## 2021-04-15 DIAGNOSIS — M17.11 PRIMARY OSTEOARTHRITIS OF RIGHT KNEE: ICD-10-CM

## 2021-04-15 PROCEDURE — 99999 PR PBB SHADOW E&M-EST. PATIENT-LVL III: CPT | Mod: PBBFAC,,, | Performed by: ORTHOPAEDIC SURGERY

## 2021-04-15 PROCEDURE — 73590 X-RAY EXAM OF LOWER LEG: CPT | Mod: TC,RT

## 2021-04-15 PROCEDURE — 99213 OFFICE O/P EST LOW 20 MIN: CPT | Mod: S$PBB,,, | Performed by: ORTHOPAEDIC SURGERY

## 2021-04-15 PROCEDURE — 73590 X-RAY EXAM OF LOWER LEG: CPT | Mod: 26,RT,, | Performed by: RADIOLOGY

## 2021-04-15 PROCEDURE — 99999 PR PBB SHADOW E&M-EST. PATIENT-LVL III: ICD-10-PCS | Mod: PBBFAC,,, | Performed by: ORTHOPAEDIC SURGERY

## 2021-04-15 PROCEDURE — 73590 XR TIBIA FIBULA 2 VIEW RIGHT: ICD-10-PCS | Mod: 26,RT,, | Performed by: RADIOLOGY

## 2021-04-15 PROCEDURE — 99213 OFFICE O/P EST LOW 20 MIN: CPT | Mod: PBBFAC,25 | Performed by: ORTHOPAEDIC SURGERY

## 2021-04-15 PROCEDURE — 99213 PR OFFICE/OUTPT VISIT, EST, LEVL III, 20-29 MIN: ICD-10-PCS | Mod: S$PBB,,, | Performed by: ORTHOPAEDIC SURGERY

## 2021-04-28 ENCOUNTER — PATIENT MESSAGE (OUTPATIENT)
Dept: RESEARCH | Facility: HOSPITAL | Age: 71
End: 2021-04-28

## 2022-07-26 ENCOUNTER — TELEPHONE (OUTPATIENT)
Dept: ORTHOPEDICS | Facility: CLINIC | Age: 72
End: 2022-07-26
Payer: COMMERCIAL

## 2022-07-26 ENCOUNTER — TELEPHONE (OUTPATIENT)
Dept: SPORTS MEDICINE | Facility: CLINIC | Age: 72
End: 2022-07-26
Payer: COMMERCIAL

## 2022-07-26 NOTE — TELEPHONE ENCOUNTER
Attempted to call pt back regarding question about injections. LVM to call back or send a [employer] message

## 2022-07-26 NOTE — TELEPHONE ENCOUNTER
----- Message from Michelle William sent at 7/26/2022  1:19 PM CDT -----  Regarding: shots  Contact: patient  Patient needs to know if she on her next visit getting the 3 shots, or just one, please call her back at [phone]

## 2022-07-26 NOTE — TELEPHONE ENCOUNTER
----- Message from Carrie John sent at 7/26/2022  3:32 PM CDT -----  Contact: Jeana  .Type:  Patient Returning Call    Who Called:Jeana  Who Left Message for Patient:Renu   Does the patient know what this is regarding?:shots in knee/appts  Would the patient rather a call back or a response via [employer]? Call back or my [employer]   Best Call Back Number:[phone]  Additional Information:n/a          Thanks  DD

## 2022-07-26 NOTE — TELEPHONE ENCOUNTER
Spoke with pt, let her know that she would need to re-assess her knee with Joelle and she will determine if she needs to do a new order for visco injections. Made appointment with her, she ok'd date and time

## 2022-08-26 ENCOUNTER — TELEPHONE (OUTPATIENT)
Dept: ORTHOPEDICS | Facility: CLINIC | Age: 72
End: 2022-08-26
Payer: COMMERCIAL

## 2022-08-26 ENCOUNTER — PATIENT MESSAGE (OUTPATIENT)
Dept: ORTHOPEDICS | Facility: CLINIC | Age: 72
End: 2022-08-26
Payer: COMMERCIAL

## 2022-08-26 DIAGNOSIS — M17.11 PRIMARY OSTEOARTHRITIS OF RIGHT KNEE: Primary | ICD-10-CM

## 2022-08-31 ENCOUNTER — OFFICE VISIT (OUTPATIENT)
Dept: ORTHOPEDICS | Facility: CLINIC | Age: 72
End: 2022-08-31
Payer: MEDICARE

## 2022-08-31 ENCOUNTER — HOSPITAL ENCOUNTER (OUTPATIENT)
Dept: RADIOLOGY | Facility: HOSPITAL | Age: 72
Discharge: HOME | End: 2022-08-31
Attending: PHYSICIAN ASSISTANT
Payer: MEDICARE

## 2022-08-31 VITALS — BODY MASS INDEX: 38.89 KG/M2 | WEIGHT: 242 LBS | HEIGHT: 66 IN

## 2022-08-31 DIAGNOSIS — M17.11 PRIMARY OSTEOARTHRITIS OF RIGHT KNEE: Primary | ICD-10-CM

## 2022-08-31 DIAGNOSIS — M17.11 PRIMARY OSTEOARTHRITIS OF RIGHT KNEE: ICD-10-CM

## 2022-08-31 PROCEDURE — 99213 OFFICE O/P EST LOW 20 MIN: CPT | Mod: S$PBB,,, | Performed by: PHYSICIAN ASSISTANT

## 2022-08-31 PROCEDURE — 73564 XR KNEE ORTHO RIGHT WITH FLEXION: ICD-10-PCS | Mod: 26,RT,, | Performed by: RADIOLOGY

## 2022-08-31 PROCEDURE — 99999 PR PBB SHADOW E&M-EST. PATIENT-LVL III: CPT | Mod: PBBFAC,,, | Performed by: PHYSICIAN ASSISTANT

## 2022-08-31 PROCEDURE — 99999 PR PBB SHADOW E&M-EST. PATIENT-LVL III: ICD-10-PCS | Mod: PBBFAC,,, | Performed by: PHYSICIAN ASSISTANT

## 2022-08-31 PROCEDURE — 99213 OFFICE O/P EST LOW 20 MIN: CPT | Mod: PBBFAC | Performed by: PHYSICIAN ASSISTANT

## 2022-08-31 PROCEDURE — 73562 X-RAY EXAM OF KNEE 3: CPT | Mod: 26,LT,, | Performed by: RADIOLOGY

## 2022-08-31 PROCEDURE — 99213 PR OFFICE/OUTPT VISIT, EST, LEVL III, 20-29 MIN: ICD-10-PCS | Mod: S$PBB,,, | Performed by: PHYSICIAN ASSISTANT

## 2022-08-31 PROCEDURE — 73562 X-RAY EXAM OF KNEE 3: CPT | Mod: TC,LT

## 2022-08-31 PROCEDURE — 73562 XR KNEE ORTHO RIGHT WITH FLEXION: ICD-10-PCS | Mod: 26,LT,, | Performed by: RADIOLOGY

## 2022-08-31 PROCEDURE — 73564 X-RAY EXAM KNEE 4 OR MORE: CPT | Mod: 26,RT,, | Performed by: RADIOLOGY

## 2022-08-31 RX ORDER — DICLOFENAC SODIUM 10 MG/G
2 GEL TOPICAL 3 TIMES DAILY
Qty: 1 EACH | Refills: 0 | Status: SHIPPED | OUTPATIENT
Start: 2022-08-31

## 2022-08-31 NOTE — PATIENT INSTRUCTIONS
Assessment:  Jeana Monsivais is a  71 y.o. female   Data Unavailable with a chief complaint of Pain of the Right Knee  Presents today for a recheck on right knee pain, hx of right knee OA with visco in the past last visco injection given on 12/2020  Right knee OA    Encounter Diagnosis   Name Primary?    Primary osteoarthritis of right knee Yes      Plan:  Right knee visco  Discussed using conservative treatment as needed: knee bracing, voltaren gel, and oral anti-inflammatories as needed    Follow-up: 3 weeks for visco right knee or sooner if there are any problems between now and then.    Leave Review:   Google: Leave Google Review  Healthgrades: Leave Healthgrades Review    After Hours Number: [phone]

## 2022-08-31 NOTE — PROGRESS NOTES
Patient ID: Jeana Monsivais  YOB: 1950  MRN: [id number]    Chief Complaint: Pain of the Right Knee    Referred By: Previous patient B&J    History of Present Illness: Jeana Monsivais is a RHD 71 y.o. female   Data Unavailable with a chief complaint of Pain of the Right Knee  Patient presents to clinic with 1/10 pain in the right knee. 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 She has received a series of Euflexxa injections 11/23/20, 12/02/20, 12/09/20. She states that she has gotten significant relief from them. She noticed slight pain in her right knee about a month ago with pivoting motions.     HPI (4/15/21)  For previous patient of bone and joint, and former teacher at [employer]. Patient is here today for a follow up for her right knee. Patient is here toy for a follow up for her right knee. She has received a series of Euflexxa injections. 11/23/20, 12/02/20, 12/09/20. She states that she has gotten significant relief from them.    Past Medical History:   History reviewed. No pertinent past medical history.  Past Surgical History:   Procedure Laterality Date    BREAST LUMPECTOMY Left 2019    HYSTERECTOMY  1997     Family History   Problem Relation Age of Onset    Heart failure Mother     Heart failure Father     Hypertrophic cardiomyopathy Sister     Hypertrophic cardiomyopathy Brother      Social History     Socioeconomic History    Marital status:    Tobacco Use    Smoking status: Never    Smokeless tobacco: Never   Substance and Sexual Activity    Alcohol use: Not Currently    Drug use: Never    Sexual activity: Not Currently     Medication List with Changes/Refills   New Medications    DICLOFENAC SODIUM (VOLTAREN) 1 % GEL    Apply 2 g topically 3 (three) times daily.   Current Medications    ANASTROZOLE (ARIMIDEX) 1 MG TAB    Take 1 mg by mouth once daily.    ERGOCALCIFEROL (ERGOCALCIFEROL) 50,000 UNIT CAP    Take 1 capsule by mouth.     EZETIMIBE (ZETIA) 10 MG TABLET    Take 10 mg by mouth once daily.    FENOFIBRATE 160 MG TAB    Take 160 mg by mouth once daily.    FEXOFENADINE (ALLEGRA) 180 MG TABLET    Take 180 mg by mouth once daily.    HYDROCHLOROTHIAZIDE (HYDRODIURIL) 25 MG TABLET    Take 25 mg by mouth once daily.    LISINOPRIL 10 MG TABLET    Take 10 mg by mouth once daily.    VITAMINS  A,C,E-ZINC-COPPER (PRESERVISION AREDS) 14,320-226-200 UNIT-MG-UNIT CAP    Take 1 tablet by mouth once a week.     Review of patient's allergies indicates:   Allergen Reactions    Erythromycin     Mycinette [cetylpyridinium-benzocaine] Diarrhea    Levaquin [levofloxacin] Nausea And Vomiting     Review of Systems   Constitutional: Negative for chills and fever.   HENT:  Negative for sore throat.    Eyes:  Negative for pain.   Cardiovascular:  Negative for chest pain and leg swelling.   Respiratory:  Negative for cough and shortness of breath.    Skin:  Negative for itching and rash.   Musculoskeletal:  Positive for joint pain and joint swelling.   Gastrointestinal:  Negative for abdominal pain, nausea and vomiting.   Genitourinary:  Negative for dysuria.   Neurological:  Negative for dizziness, numbness and paresthesias.     Physical Exam:   Body mass index is 39.06 kg/m².  There were no vitals filed for this visit.   GENERAL: Well appearing, appropriate for stated age, no acute distress.  CARDIOVASCULAR: Pulses regular by peripheral palpation.  PULMONARY: Respirations are even and non-labored.  NEURO: Awake, alert, and oriented x 3.  PSYCH: Mood & affect are appropriate.  HEENT: Head is normocephalic and atraumatic.  General    Nursing note and vitals reviewed.          Right Knee Exam   Right knee exam is normal.    Inspection   Effusion: present    Tenderness   The patient is tender to palpation of the medial joint line.    Range of Motion   Extension:  0   Flexion:  120     Tests   Ligament Examination   Lachman: normal (-1 to 2mm)   PCL-Posterior Drawer:  normal (0 to 2mm)     MCL - Valgus: normal (0 to 2mm)  LCL - Varus: normal    Other   Sensation: normal    Left Knee Exam   Left knee exam is normal.    Inspection   Effusion: absent    Tenderness   The patient is experiencing no tenderness.     Range of Motion   Extension:  0   Flexion:  120     Tests   Stability   Lachman: normal (-1 to 2mm)   PCL-Posterior Drawer: normal (0 to 2mm)  MCL - Valgus: normal (0 to 2mm)  LCL - Varus: normal (0 to 2mm)    Other   Sensation: normal    Muscle Strength   Right Lower Extremity   Hip Abduction: 5/5   Quadriceps:  5/5   Hamstrin/5   Left Lower Extremity   Hip Abduction: 5/5   Quadriceps:  5/5   Hamstrin/5     Vascular Exam     Right Pulses  Dorsalis Pedis:      2+  Posterior Tibial:      2+        Left Pulses  Dorsalis Pedis:      2+  Posterior Tibial:      2+      All compartments are soft and compressible. Calf soft non-tender. Intact EHL, FHL, gastroc soleus, and tibialis anterior. Sensation intact to light touch in superficial peroneal, deep peroneal, tibial, sural, and saphenous nerve distributions. Foot warm and well perfused with capillary refill of less than 2 seconds and palpable pedal pulses.     Imaging:    X-ray Knee Ortho Right with Flexion  Narrative: EXAMINATION:  XR KNEE ORTHO RIGHT WITH FLEXION    CLINICAL HISTORY:  Unilateral primary osteoarthritis, right knee    TECHNIQUE:  AP standing as well as PA flexion standing and Merchant views of both knees were performed.  A lateral view of the right knee is also performed.    COMPARISON:  10/26/2020    FINDINGS:  Old healed proximal right fibular shaft fracture.  There is no acute fracture.  No dislocation.  Mild bilateral degenerative changes with slight narrowing in the medial compartments with minimal adjacent spurring.  Mild patellofemoral compartment narrowing with spurring also noted.  There is no joint effusion.  Tissues are normal.  Impression: See above    Electronically signed by: Yaniv Montesinos  MD  Date:    08/31/2022  Time:    09:48      Relevant imaging results reviewed and interpreted by me, discussed with the patient and / or family today.    Other Tests:     Patient Instructions   Assessment:  Jeana Monsivais is a  71 y.o. female   Data Unavailable with a chief complaint of Pain of the Right Knee  Presents today for a recheck on right knee pain, hx of right knee OA with visco in the past last visco injection given on 12/2020  Right knee OA    Encounter Diagnosis   Name Primary?    Primary osteoarthritis of right knee Yes      Plan:  Right knee visco  Discussed using conservative treatment as needed: knee bracing, voltaren gel, and oral anti-inflammatories as needed    Follow-up: 3 weeks for visco right knee or sooner if there are any problems between now and then.    Leave Review:   Google: Leave Google Review  Healthgrades: Leave Healthgrades Review    After Hours Number: [phone]      Provider Note/Medical Decision Making:   MEDICAL NECESSITY FOR VISCOSUPPLEMENTATION: After thorough evaluation of the patient, I have determined that visco-supplementation is medically necessary. The patient has painful DJD of the knee with failure of conservative therapy including lifestyle modifications and rehabilitation exercises. Oral analgesis/NSAIDs have not adequately controlled symptoms and there is radiographic evidence of joint space narrowing, subchondral sclerosis, and some early osteophytic changes, or in lack of radiographic evidence, there is arthroscopic or other evidence of chondrosis.  Kellgren- Son grade 2 or greater.     I discussed worrisome and red flag signs and symptoms with the patient. The patient expressed understanding and agreed to alert me immediately or to go to the emergency room if they experience any of these.   Treatment plan was developed with input from the patient/family, and they expressed understanding and agreement with the plan. All questions were answered  today.    Disclaimer: This note was prepared using a voice recognition system and is likely to have sound alike errors within the text.

## 2022-09-21 ENCOUNTER — PROCEDURE VISIT (OUTPATIENT)
Dept: ORTHOPEDICS | Facility: CLINIC | Age: 72
End: 2022-09-21
Payer: MEDICARE

## 2022-09-21 VITALS — BODY MASS INDEX: 38.89 KG/M2 | WEIGHT: 242 LBS | HEIGHT: 66 IN

## 2022-09-21 DIAGNOSIS — M17.11 PRIMARY OSTEOARTHRITIS OF RIGHT KNEE: Primary | ICD-10-CM

## 2022-09-21 PROCEDURE — 20610 DRAIN/INJ JOINT/BURSA W/O US: CPT | Mod: PBBFAC,RT | Performed by: PHYSICIAN ASSISTANT

## 2022-09-21 PROCEDURE — 20610 DRAIN/INJ JOINT/BURSA W/O US: CPT | Mod: S$PBB,,, | Performed by: PHYSICIAN ASSISTANT

## 2022-09-21 PROCEDURE — 20610 LARGE JOINT ASPIRATION/INJECTION: R KNEE: ICD-10-PCS | Mod: S$PBB,,, | Performed by: PHYSICIAN ASSISTANT

## 2022-09-21 RX ADMIN — Medication 20 MG: at 10:09

## 2022-09-21 NOTE — PROCEDURES
Large Joint Aspiration/Injection: R knee    Date/Time: 9/21/2022 10:00 AM  Performed by: Joelle Castillo PA-C  Authorized by: Joelle Castillo PA-C     Consent Done?:  Yes (Verbal)  Indications:  Joint swelling and pain  Site marked: the procedure site was marked    Timeout: prior to procedure the correct patient, procedure, and site was verified    Prep: patient was prepped and draped in usual sterile fashion      Local anesthesia used?: Yes    Local anesthetic:  Topical anesthetic    Details:  Needle Size:  22 G  Ultrasonic Guidance for needle placement?: No    Approach:  Superior  Location:  Knee  Site:  R knee  Medications:  20 mg sodium hyaluronate (EUFLEXXA) 10 mg/mL(mw 2.4 -3.6 million)  Patient tolerance:  Patient tolerated the procedure well with no immediate complications     1/3

## 2022-09-28 ENCOUNTER — PROCEDURE VISIT (OUTPATIENT)
Dept: ORTHOPEDICS | Facility: CLINIC | Age: 72
End: 2022-09-28
Payer: MEDICARE

## 2022-09-28 VITALS — WEIGHT: 242.06 LBS | BODY MASS INDEX: 38.9 KG/M2 | HEIGHT: 66 IN

## 2022-09-28 DIAGNOSIS — M17.11 PRIMARY OSTEOARTHRITIS OF RIGHT KNEE: Primary | ICD-10-CM

## 2022-09-28 PROCEDURE — 99499 NO LOS: ICD-10-PCS | Mod: S$PBB,,, | Performed by: PHYSICIAN ASSISTANT

## 2022-09-28 PROCEDURE — 99499 UNLISTED E&M SERVICE: CPT | Mod: S$PBB,,, | Performed by: PHYSICIAN ASSISTANT

## 2022-09-28 PROCEDURE — 20610 LARGE JOINT ASPIRATION/INJECTION: R KNEE: ICD-10-PCS | Mod: S$PBB,RT,, | Performed by: PHYSICIAN ASSISTANT

## 2022-09-28 PROCEDURE — 20610 DRAIN/INJ JOINT/BURSA W/O US: CPT | Mod: PBBFAC,RT | Performed by: PHYSICIAN ASSISTANT

## 2022-09-28 PROCEDURE — 20610 DRAIN/INJ JOINT/BURSA W/O US: CPT | Mod: S$PBB,RT,, | Performed by: PHYSICIAN ASSISTANT

## 2022-09-28 RX ORDER — CYANOCOBALAMIN 1000 UG/ML
1000 INJECTION, SOLUTION INTRAMUSCULAR; SUBCUTANEOUS
COMMUNITY
Start: 2022-09-07

## 2022-09-28 RX ORDER — OLOPATADINE HYDROCHLORIDE 1 MG/ML
1 SOLUTION/ DROPS OPHTHALMIC 2 TIMES DAILY
COMMUNITY
Start: 2022-08-30

## 2022-09-28 RX ORDER — CYCLOBENZAPRINE HCL 5 MG
5 TABLET ORAL 2 TIMES DAILY PRN
COMMUNITY
Start: 2022-07-26

## 2022-09-28 RX ADMIN — Medication 20 MG: at 10:09

## 2022-09-28 NOTE — PROCEDURES
Large Joint Aspiration/Injection: R knee    Date/Time: 9/28/2022 10:00 AM  Performed by: Joelle Castillo PA-C  Authorized by: Joelle Castillo PA-C     Consent Done?:  Yes (Verbal)  Indications:  Joint swelling and pain  Site marked: the procedure site was marked    Timeout: prior to procedure the correct patient, procedure, and site was verified    Prep: patient was prepped and draped in usual sterile fashion      Local anesthesia used?: Yes    Local anesthetic:  Topical anesthetic    Details:  Needle Size:  22 G  Ultrasonic Guidance for needle placement?: No    Approach:  Superior  Location:  Knee  Site:  R knee  Medications:  20 mg sodium hyaluronate (EUFLEXXA) 10 mg/mL(mw 2.4 -3.6 million)  Patient tolerance:  Patient tolerated the procedure well with no immediate complications     2/3

## 2022-10-05 ENCOUNTER — PROCEDURE VISIT (OUTPATIENT)
Dept: ORTHOPEDICS | Facility: CLINIC | Age: 72
End: 2022-10-05
Payer: MEDICARE

## 2022-10-05 VITALS — HEIGHT: 66 IN | WEIGHT: 242 LBS | BODY MASS INDEX: 38.89 KG/M2

## 2022-10-05 DIAGNOSIS — M17.11 PRIMARY OSTEOARTHRITIS OF RIGHT KNEE: Primary | ICD-10-CM

## 2022-10-05 PROCEDURE — 99499 UNLISTED E&M SERVICE: CPT | Mod: S$PBB,,, | Performed by: PHYSICIAN ASSISTANT

## 2022-10-05 PROCEDURE — 99499 NO LOS: ICD-10-PCS | Mod: S$PBB,,, | Performed by: PHYSICIAN ASSISTANT

## 2022-10-05 PROCEDURE — 20610 DRAIN/INJ JOINT/BURSA W/O US: CPT | Mod: PBBFAC,RT | Performed by: PHYSICIAN ASSISTANT

## 2022-10-05 PROCEDURE — 20610 DRAIN/INJ JOINT/BURSA W/O US: CPT | Mod: S$PBB,RT,, | Performed by: PHYSICIAN ASSISTANT

## 2022-10-05 PROCEDURE — 20610 LARGE JOINT ASPIRATION/INJECTION: R KNEE: ICD-10-PCS | Mod: S$PBB,RT,, | Performed by: PHYSICIAN ASSISTANT

## 2022-10-05 RX ADMIN — Medication 20 MG: at 10:10

## 2022-10-05 NOTE — PROCEDURES
Large Joint Aspiration/Injection: R knee    Date/Time: 10/5/2022 10:00 AM  Performed by: Joelle Castillo PA-C  Authorized by: Joelle Castillo PA-C     Consent Done?:  Yes (Verbal)  Indications:  Joint swelling and pain  Site marked: the procedure site was marked    Timeout: prior to procedure the correct patient, procedure, and site was verified    Prep: patient was prepped and draped in usual sterile fashion      Local anesthesia used?: Yes    Local anesthetic:  Topical anesthetic    Details:  Needle Size:  22 G  Ultrasonic Guidance for needle placement?: No    Approach:  Superior  Location:  Knee  Site:  R knee  Medications:  20 mg sodium hyaluronate (EUFLEXXA) 10 mg/mL(mw 2.4 -3.6 million)  Patient tolerance:  Patient tolerated the procedure well with no immediate complications     3/3

## 2022-10-05 NOTE — PROCEDURES
Large Joint Aspiration/Injection: bilateral knee    Date/Time: 10/5/2022 10:00 AM  Performed by: Joelle Castillo PA-C  Authorized by: Joelle Castillo PA-C     Consent Done?:  Yes (Verbal)  Indications:  Pain  Site marked: the procedure site was marked    Timeout: prior to procedure the correct patient, procedure, and site was verified    Prep: patient was prepped and draped in usual sterile fashion      Local anesthesia used?: Yes    Local anesthetic:  Topical anesthetic    Details:  Needle Size:  22 G  Ultrasonic Guidance for needle placement?: No    Approach:  Superior  Location:  Knee  Laterality:  Bilateral  Site:  Bilateral knee  Medications (Right):  20 mg sodium hyaluronate (EUFLEXXA) 10 mg/mL(mw 2.4 -3.6 million)  Medications (Left):  20 mg sodium hyaluronate (EUFLEXXA) 10 mg/mL(mw 2.4 -3.6 million)  Patient tolerance:  Patient tolerated the procedure well with no immediate complications     Bilateral Euflexxa injection  3/3

## 2023-03-01 ENCOUNTER — TELEPHONE (OUTPATIENT)
Dept: ORTHOPEDICS | Facility: CLINIC | Age: 73
End: 2023-03-01
Payer: COMMERCIAL

## 2023-03-01 NOTE — TELEPHONE ENCOUNTER
----- Message from Michael Lal sent at 3/1/2023  4:00 PM CST -----  Contact: Jeana Hills is requesting a call back from the nurse to get her appointment on 3/8 changed to a different day . Please call her back at [phone]      Thanks  CF

## 2023-03-27 ENCOUNTER — OFFICE VISIT (OUTPATIENT)
Dept: ORTHOPEDICS | Facility: CLINIC | Age: 73
End: 2023-03-27
Payer: MEDICARE

## 2023-03-27 VITALS — WEIGHT: 242 LBS | BODY MASS INDEX: 38.89 KG/M2 | HEIGHT: 66 IN

## 2023-03-27 DIAGNOSIS — M25.561 ACUTE PAIN OF RIGHT KNEE: ICD-10-CM

## 2023-03-27 DIAGNOSIS — M17.11 PRIMARY OSTEOARTHRITIS OF RIGHT KNEE: Primary | ICD-10-CM

## 2023-03-27 PROCEDURE — 99999 PR PBB SHADOW E&M-EST. PATIENT-LVL III: ICD-10-PCS | Mod: PBBFAC,,, | Performed by: PHYSICIAN ASSISTANT

## 2023-03-27 PROCEDURE — 99213 OFFICE O/P EST LOW 20 MIN: CPT | Mod: PBBFAC | Performed by: PHYSICIAN ASSISTANT

## 2023-03-27 PROCEDURE — 99999 PR PBB SHADOW E&M-EST. PATIENT-LVL III: CPT | Mod: PBBFAC,,, | Performed by: PHYSICIAN ASSISTANT

## 2023-03-27 PROCEDURE — 99213 OFFICE O/P EST LOW 20 MIN: CPT | Mod: S$PBB,,, | Performed by: PHYSICIAN ASSISTANT

## 2023-03-27 PROCEDURE — 99213 PR OFFICE/OUTPT VISIT, EST, LEVL III, 20-29 MIN: ICD-10-PCS | Mod: S$PBB,,, | Performed by: PHYSICIAN ASSISTANT

## 2023-03-27 NOTE — PATIENT INSTRUCTIONS
Assessment:  Jeana Monsivais is a  72 y.o. female   Data Unavailable with a chief complaint of Pain of the Right Knee  Presents today for right knee pain, with a hx of right knee OA  5 months s/p right knee visco (10/5/22)    Encounter Diagnoses   Name Primary?    Primary osteoarthritis of right knee Yes    Acute pain of right knee         Plan:  Continue conservative treatment   Restart visco Right knee  Prefers appointments of:  April 17 @ 11  April 24 @ 11   May 1 @ 11    Follow-up: Joelle for visco  or sooner if there are any problems between now and then.    Leave Review:   Google: Leave Google Review  Healthgrades: Leave Healthgrades Review    After Hours Number: [phone]

## 2023-03-27 NOTE — PROGRESS NOTES
Patient ID: Jeana Monsivais  YOB: 1950  MRN: [id number]    Chief Complaint: Pain of the Right Knee      Referred By: Previous patient B&J    History of Present Illness: Jeana Monsivais is a  72 y.o. female   Data Unavailable with a chief complaint of Pain of the Right Knee  Patient is 0/10 pain in her right knee. 5mo s/p R visco (10/5/22). 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 Reports good relief from the injections. Some persistent soreness in the injection area for around 4 months. Patient is here to discuss reorder.     HPI  Patient presents to clinic with 1/10 pain in the right knee. 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 She states that she has gotten significant relief from them. She noticed slight pain in her right knee about a month ago with pivoting motions.   Past Medical History:   History reviewed. No pertinent past medical history.  Past Surgical History:   Procedure Laterality Date    BREAST LUMPECTOMY Left 2019    HYSTERECTOMY  1997     Family History   Problem Relation Age of Onset    Heart failure Mother     Heart failure Father     Hypertrophic cardiomyopathy Sister     Hypertrophic cardiomyopathy Brother      Social History     Socioeconomic History    Marital status:    Tobacco Use    Smoking status: Never    Smokeless tobacco: Never   Substance and Sexual Activity    Alcohol use: Not Currently    Drug use: Never    Sexual activity: Not Currently     Medication List with Changes/Refills   Current Medications    ANASTROZOLE (ARIMIDEX) 1 MG TAB    Take 1 mg by mouth once daily.    CYANOCOBALAMIN 1,000 MCG/ML INJECTION    Inject 1,000 mcg into the muscle every 30 days.    CYCLOBENZAPRINE (FLEXERIL) 5 MG TABLET    Take 5 mg by mouth 2 (two) times daily as needed.    DICLOFENAC SODIUM  (VOLTAREN) 1 % GEL    Apply 2 g topically 3 (three) times daily.    ERGOCALCIFEROL (ERGOCALCIFEROL) 50,000 UNIT CAP    Take 1 capsule by mouth.    EZETIMIBE (ZETIA) 10 MG TABLET    Take 10 mg by mouth once daily.    FENOFIBRATE 160 MG TAB    Take 160 mg by mouth once daily.    FEXOFENADINE (ALLEGRA) 180 MG TABLET    Take 180 mg by mouth once daily.    HYDROCHLOROTHIAZIDE (HYDRODIURIL) 25 MG TABLET    Take 25 mg by mouth once daily.    LISINOPRIL 10 MG TABLET    Take 10 mg by mouth once daily.    OLOPATADINE (PATANOL) 0.1 % OPHTHALMIC SOLUTION    Place 1 drop into both eyes 2 (two) times daily.    VITAMINS  A,C,E-ZINC-COPPER (PRESERVISION AREDS) 14,320-226-200 UNIT-MG-UNIT CAP    Take 1 tablet by mouth once a week.     Review of patient's allergies indicates:   Allergen Reactions    Erythromycin     Mycinette [cetylpyridinium-benzocaine] Diarrhea    Levaquin [levofloxacin] Nausea And Vomiting     Review of Systems   Constitutional: Negative for chills and fever.   HENT:  Negative for sore throat.    Eyes:  Negative for pain.   Cardiovascular:  Negative for chest pain and leg swelling.   Respiratory:  Negative for cough and shortness of breath.    Skin:  Negative for itching and rash.   Musculoskeletal:  Positive for joint pain, joint swelling and myalgias.   Gastrointestinal:  Negative for abdominal pain, nausea and vomiting.   Genitourinary:  Negative for dysuria.   Neurological:  Negative for dizziness, numbness and paresthesias.     Physical Exam:   Body mass index is 39.06 kg/m².  There were no vitals filed for this visit.   GENERAL: Well appearing, appropriate for stated age, no acute distress.  CARDIOVASCULAR: Pulses regular by peripheral palpation.  PULMONARY: Respirations are even and non-labored.  NEURO: Awake, alert, and oriented x 3.  PSYCH: Mood & affect are appropriate.  HEENT: Head is normocephalic and atraumatic.  General    Nursing note and vitals reviewed.          Right Knee Exam   Right knee exam  is normal.    Inspection   Effusion: absent    Tenderness   The patient is tender to palpation of the medial joint line.    Crepitus   The patient has crepitus of the patella.    Range of Motion   Extension:  0   Flexion:  120     Tests   Ligament Examination   Lachman: normal (-1 to 2mm)   PCL-Posterior Drawer: normal (0 to 2mm)     MCL - Valgus: normal (0 to 2mm)  LCL - Varus: normal    Other   Sensation: normal    Left Knee Exam   Left knee exam is normal.    Inspection   Effusion: absent    Tenderness   The patient is experiencing no tenderness.     Crepitus   The patient has crepitus of the patella.    Range of Motion   Extension:  0   Flexion:  120     Tests   Stability   Lachman: normal (-1 to 2mm)   PCL-Posterior Drawer: normal (0 to 2mm)  MCL - Valgus: normal (0 to 2mm)  LCL - Varus: normal (0 to 2mm)    Other   Sensation: normal    Muscle Strength   Right Lower Extremity   Hip Abduction: 5/5   Quadriceps:  4/5   Hamstrin/5   Left Lower Extremity   Hip Abduction: 5/5   Quadriceps:  4/5   Hamstrin/5     Vascular Exam     Right Pulses  Dorsalis Pedis:      2+  Posterior Tibial:      2+        Left Pulses  Dorsalis Pedis:      2+  Posterior Tibial:      2+      All compartments are soft and compressible. Calf soft non-tender. Intact EHL, FHL, gastroc soleus, and tibialis anterior. Sensation intact to light touch in superficial peroneal, deep peroneal, tibial, sural, and saphenous nerve distributions. Foot warm and well perfused with capillary refill of less than 2 seconds and palpable pedal pulses.       Imaging:    X-ray Knee Ortho Right with Flexion  Narrative: EXAMINATION:  XR KNEE ORTHO RIGHT WITH FLEXION    CLINICAL HISTORY:  Unilateral primary osteoarthritis, right knee    TECHNIQUE:  AP standing as well as PA flexion standing and Merchant views of both knees were performed.  A lateral view of the right knee is also performed.    COMPARISON:  10/26/2020    FINDINGS:  Old healed proximal right  fibular shaft fracture.  There is no acute fracture.  No dislocation.  Mild bilateral degenerative changes with slight narrowing in the medial compartments with minimal adjacent spurring.  Mild patellofemoral compartment narrowing with spurring also noted.  There is no joint effusion.  Tissues are normal.  Impression: See above    Electronically signed by: Yaniv Montesinos MD  Date:    08/31/2022  Time:    09:48      Relevant imaging results reviewed and interpreted by me, discussed with the patient and / or family today.     Other Tests:         Patient Instructions   Assessment:  Jeana Monsivais is a  72 y.o. female   Data Unavailable with a chief complaint of Pain of the Right Knee  Presents today for right knee pain, with a hx of right knee OA  5 months s/p right knee visco (10/5/22)    Encounter Diagnoses   Name Primary?    Primary osteoarthritis of right knee Yes    Acute pain of right knee         Plan:  Continue conservative treatment   Restart visco Right knee  Prefers appointments of:  April 17 @ 11  April 24 @ 11   May 1 @ 11    Follow-up: Joelle for visco  or sooner if there are any problems between now and then.    Leave Review:   Google: Leave Google Review  Healthgrades: Leave Healthgrades Review    After Hours Number: [phone]      Provider Note/Medical Decision Making:   MEDICAL NECESSITY FOR VISCOSUPPLEMENTATION: After thorough evaluation of the patient, I have determined that visco-supplementation is medically necessary. The patient has painful DJD of the knee with failure of conservative therapy including lifestyle modifications and rehabilitation exercises. Oral analgesis/NSAIDs have not adequately controlled symptoms and there is radiographic evidence of joint space narrowing, subchondral sclerosis, and some early osteophytic changes Kellgren- Son grade 2 or greater, or in lack of radiographic evidence, there is arthroscopic or other evidence of chondrosis.      I discussed worrisome and  red flag signs and symptoms with the patient. The patient expressed understanding and agreed to alert me immediately or to go to the emergency room if they experience any of these.   Treatment plan was developed with input from the patient/family, and they expressed understanding and agreement with the plan. All questions were answered today.        Disclaimer: This note was prepared using a voice recognition system and is likely to have sound alike errors within the text.

## 2023-04-24 ENCOUNTER — PROCEDURE VISIT (OUTPATIENT)
Dept: ORTHOPEDICS | Facility: CLINIC | Age: 73
End: 2023-04-24
Payer: MEDICARE

## 2023-04-24 DIAGNOSIS — M17.11 PRIMARY OSTEOARTHRITIS OF RIGHT KNEE: Primary | ICD-10-CM

## 2023-04-24 PROCEDURE — 20610 DRAIN/INJ JOINT/BURSA W/O US: CPT | Mod: S$PBB,RT,, | Performed by: PHYSICIAN ASSISTANT

## 2023-04-24 PROCEDURE — 99499 UNLISTED E&M SERVICE: CPT | Mod: S$PBB,,, | Performed by: PHYSICIAN ASSISTANT

## 2023-04-24 PROCEDURE — 20610 LARGE JOINT ASPIRATION/INJECTION: R KNEE: ICD-10-PCS | Mod: S$PBB,RT,, | Performed by: PHYSICIAN ASSISTANT

## 2023-04-24 PROCEDURE — 20610 DRAIN/INJ JOINT/BURSA W/O US: CPT | Mod: PBBFAC | Performed by: PHYSICIAN ASSISTANT

## 2023-04-24 PROCEDURE — 99499 NO LOS: ICD-10-PCS | Mod: S$PBB,,, | Performed by: PHYSICIAN ASSISTANT

## 2023-04-24 RX ADMIN — Medication 20 MG: at 11:04

## 2023-04-24 NOTE — PROCEDURES
Large Joint Aspiration/Injection: R knee    Date/Time: 4/24/2023 11:00 AM  Performed by: Joelle Castillo PA-C  Authorized by: Joelle Castillo PA-C     Consent Done?:  Yes (Verbal)  Indications:  Joint swelling and pain  Site marked: the procedure site was marked    Timeout: prior to procedure the correct patient, procedure, and site was verified    Prep: patient was prepped and draped in usual sterile fashion      Local anesthesia used?: Yes    Local anesthetic:  Topical anesthetic    Details:  Needle Size:  22 G  Ultrasonic Guidance for needle placement?: No    Approach:  Superior  Location:  Knee  Site:  R knee  Medications:  20 mg sodium hyaluronate (EUFLEXXA) 10 mg/mL(mw 2.4 -3.6 million)  Patient tolerance:  Patient tolerated the procedure well with no immediate complications     1/3

## 2023-05-01 ENCOUNTER — PROCEDURE VISIT (OUTPATIENT)
Dept: ORTHOPEDICS | Facility: CLINIC | Age: 73
End: 2023-05-01
Payer: MEDICARE

## 2023-05-01 DIAGNOSIS — M17.11 PRIMARY OSTEOARTHRITIS OF RIGHT KNEE: Primary | ICD-10-CM

## 2023-05-01 PROCEDURE — 99499 UNLISTED E&M SERVICE: CPT | Mod: S$PBB,,, | Performed by: PHYSICIAN ASSISTANT

## 2023-05-01 PROCEDURE — 20610 LARGE JOINT ASPIRATION/INJECTION: R KNEE: ICD-10-PCS | Mod: S$PBB,RT,, | Performed by: PHYSICIAN ASSISTANT

## 2023-05-01 PROCEDURE — 99499 NO LOS: ICD-10-PCS | Mod: S$PBB,,, | Performed by: PHYSICIAN ASSISTANT

## 2023-05-01 PROCEDURE — 20610 DRAIN/INJ JOINT/BURSA W/O US: CPT | Mod: PBBFAC,RT | Performed by: PHYSICIAN ASSISTANT

## 2023-05-01 PROCEDURE — 20610 DRAIN/INJ JOINT/BURSA W/O US: CPT | Mod: S$PBB,RT,, | Performed by: PHYSICIAN ASSISTANT

## 2023-05-01 RX ADMIN — Medication 20 MG: at 11:05

## 2023-05-01 NOTE — PROCEDURES
Large Joint Aspiration/Injection: R knee    Date/Time: 5/1/2023 11:00 AM  Performed by: Joelle Castillo PA-C  Authorized by: Joelle Castillo PA-C     Consent Done?:  Yes (Verbal)  Indications:  Joint swelling and pain  Site marked: the procedure site was marked    Timeout: prior to procedure the correct patient, procedure, and site was verified    Prep: patient was prepped and draped in usual sterile fashion      Local anesthesia used?: Yes    Local anesthetic:  Topical anesthetic    Details:  Needle Size:  22 G  Ultrasonic Guidance for needle placement?: No    Approach:  Superior  Location:  Knee  Site:  R knee  Medications:  20 mg sodium hyaluronate (EUFLEXXA) 10 mg/mL(mw 2.4 -3.6 million)  Patient tolerance:  Patient tolerated the procedure well with no immediate complications     2/3

## 2023-05-08 ENCOUNTER — OFFICE VISIT (OUTPATIENT)
Dept: PODIATRY | Facility: CLINIC | Age: 73
End: 2023-05-08
Payer: MEDICARE

## 2023-05-08 ENCOUNTER — PROCEDURE VISIT (OUTPATIENT)
Dept: ORTHOPEDICS | Facility: CLINIC | Age: 73
End: 2023-05-08
Payer: MEDICARE

## 2023-05-08 DIAGNOSIS — M17.11 PRIMARY OSTEOARTHRITIS OF RIGHT KNEE: Primary | ICD-10-CM

## 2023-05-08 DIAGNOSIS — L60.0 INGROWN LEFT GREATER TOENAIL: Primary | ICD-10-CM

## 2023-05-08 PROCEDURE — 11750 EXCISION NAIL&NAIL MATRIX: CPT | Mod: TA,PBBFAC | Performed by: PODIATRIST

## 2023-05-08 PROCEDURE — 99203 OFFICE O/P NEW LOW 30 MIN: CPT | Mod: 25,S$PBB,, | Performed by: PODIATRIST

## 2023-05-08 PROCEDURE — 11750 NAIL REMOVAL: ICD-10-PCS | Mod: TA,S$PBB,, | Performed by: PODIATRIST

## 2023-05-08 PROCEDURE — 99999 PR PBB SHADOW E&M-EST. PATIENT-LVL III: CPT | Mod: PBBFAC,,, | Performed by: PODIATRIST

## 2023-05-08 PROCEDURE — 99213 OFFICE O/P EST LOW 20 MIN: CPT | Mod: PBBFAC,25 | Performed by: PODIATRIST

## 2023-05-08 PROCEDURE — 99499 NO LOS: ICD-10-PCS | Mod: S$PBB,,, | Performed by: PHYSICIAN ASSISTANT

## 2023-05-08 PROCEDURE — 99499 UNLISTED E&M SERVICE: CPT | Mod: S$PBB,,, | Performed by: PHYSICIAN ASSISTANT

## 2023-05-08 PROCEDURE — 20610 LARGE JOINT ASPIRATION/INJECTION: R KNEE: ICD-10-PCS | Mod: S$PBB,RT,, | Performed by: PHYSICIAN ASSISTANT

## 2023-05-08 PROCEDURE — 20610 DRAIN/INJ JOINT/BURSA W/O US: CPT | Mod: PBBFAC | Performed by: PHYSICIAN ASSISTANT

## 2023-05-08 PROCEDURE — 99203 PR OFFICE/OUTPT VISIT, NEW, LEVL III, 30-44 MIN: ICD-10-PCS | Mod: 25,S$PBB,, | Performed by: PODIATRIST

## 2023-05-08 PROCEDURE — 20610 DRAIN/INJ JOINT/BURSA W/O US: CPT | Mod: S$PBB,RT,, | Performed by: PHYSICIAN ASSISTANT

## 2023-05-08 PROCEDURE — 99999 PR PBB SHADOW E&M-EST. PATIENT-LVL III: ICD-10-PCS | Mod: PBBFAC,,, | Performed by: PODIATRIST

## 2023-05-08 RX ADMIN — Medication 20 MG: at 10:05

## 2023-05-08 NOTE — PROCEDURES
Large Joint Aspiration/Injection: R knee    Date/Time: 5/8/2023 10:00 AM  Performed by: Joelle Castillo PA-C  Authorized by: Joelle Castillo PA-C     Consent Done?:  Yes (Verbal)  Indications:  Joint swelling and pain  Site marked: the procedure site was marked    Timeout: prior to procedure the correct patient, procedure, and site was verified    Prep: patient was prepped and draped in usual sterile fashion      Local anesthesia used?: Yes    Local anesthetic:  Topical anesthetic    Details:  Needle Size:  22 G  Ultrasonic Guidance for needle placement?: No    Approach:  Superior  Location:  Knee  Site:  R knee  Medications:  20 mg sodium hyaluronate (EUFLEXXA) 10 mg/mL(mw 2.4 -3.6 million)  Patient tolerance:  Patient tolerated the procedure well with no immediate complications     3/3

## 2023-05-08 NOTE — PROCEDURES
Nail Removal    Date/Time: 5/8/2023 12:30 PM  Performed by: Tessa Chan DPM  Authorized by: Tessa Chan DPM     Consent Done?:  Yes (Written)  Time out: Immediately prior to the procedure a time out was called    Prep: patient was prepped and draped in usual sterile fashion    Location:     Location:  Left foot    Location detail:  Left big toe  Anesthesia:     Anesthesia:  Digital block    Local anesthetic:  Lidocaine 1% without epinephrine    Anesthetic total (ml):  3  Procedure Details:     Preparation:  Skin prepped with alcohol and skin prepped with Betadine    Side:  Medial    Wedge excision of skin of nail fold: No      Nail bed sutured?: No      Nail matrix removed:  Partial    Removal method:  Phenol and alcohol    Removed nail replaced and anchored: No      Dressing applied:  4x4, antibiotic ointment and dressing applied    Patient tolerance:  Patient tolerated the procedure well with no immediate complications

## 2023-05-12 RX ORDER — SULFAMETHOXAZOLE AND TRIMETHOPRIM 800; 160 MG/1; MG/1
1 TABLET ORAL 2 TIMES DAILY
Qty: 14 TABLET | Refills: 0 | Status: SHIPPED | OUTPATIENT
Start: 2023-05-12 | End: 2023-05-19

## 2023-09-06 DIAGNOSIS — M25.561 ACUTE PAIN OF RIGHT KNEE: Primary | ICD-10-CM

## 2023-09-08 ENCOUNTER — OFFICE VISIT (OUTPATIENT)
Dept: SPORTS MEDICINE | Facility: CLINIC | Age: 73
End: 2023-09-08
Payer: MEDICARE

## 2023-09-08 ENCOUNTER — HOSPITAL ENCOUNTER (OUTPATIENT)
Dept: RADIOLOGY | Facility: HOSPITAL | Age: 73
Discharge: HOME | End: 2023-09-08
Attending: PHYSICIAN ASSISTANT
Payer: MEDICARE

## 2023-09-08 VITALS — HEIGHT: 66 IN | BODY MASS INDEX: 38.9 KG/M2 | WEIGHT: 242.06 LBS

## 2023-09-08 DIAGNOSIS — M25.561 ACUTE PAIN OF RIGHT KNEE: ICD-10-CM

## 2023-09-08 DIAGNOSIS — M17.11 PRIMARY OSTEOARTHRITIS OF RIGHT KNEE: Primary | ICD-10-CM

## 2023-09-08 PROCEDURE — 99999 PR PBB SHADOW E&M-EST. PATIENT-LVL III: CPT | Mod: PBBFAC,,, | Performed by: PHYSICIAN ASSISTANT

## 2023-09-08 PROCEDURE — 99999 PR PBB SHADOW E&M-EST. PATIENT-LVL III: ICD-10-PCS | Mod: PBBFAC,,, | Performed by: PHYSICIAN ASSISTANT

## 2023-09-08 PROCEDURE — 99213 OFFICE O/P EST LOW 20 MIN: CPT | Mod: S$PBB,,, | Performed by: PHYSICIAN ASSISTANT

## 2023-09-08 PROCEDURE — 99213 PR OFFICE/OUTPT VISIT, EST, LEVL III, 20-29 MIN: ICD-10-PCS | Mod: S$PBB,,, | Performed by: PHYSICIAN ASSISTANT

## 2023-09-08 PROCEDURE — 73564 XR KNEE ORTHO RIGHT WITH FLEXION: ICD-10-PCS | Mod: 26,RT,, | Performed by: RADIOLOGY

## 2023-09-08 PROCEDURE — 73564 X-RAY EXAM KNEE 4 OR MORE: CPT | Mod: 26,RT,, | Performed by: RADIOLOGY

## 2023-09-08 PROCEDURE — 73562 X-RAY EXAM OF KNEE 3: CPT | Mod: 26,LT,, | Performed by: RADIOLOGY

## 2023-09-08 PROCEDURE — 99213 OFFICE O/P EST LOW 20 MIN: CPT | Mod: PBBFAC | Performed by: PHYSICIAN ASSISTANT

## 2023-09-08 PROCEDURE — 73562 XR KNEE ORTHO RIGHT WITH FLEXION: ICD-10-PCS | Mod: 26,LT,, | Performed by: RADIOLOGY

## 2023-09-08 PROCEDURE — 73564 X-RAY EXAM KNEE 4 OR MORE: CPT | Mod: TC,RT

## 2023-09-12 NOTE — PATIENT INSTRUCTIONS
Assessment:  Jeana Monsivais is a  72 y.o. female   Data Unavailable with a chief complaint of Pain of the Right Knee  Presents today for a recheck on right knee pain, with a hx of right knee OA.   Right knee OA   S/p right knee visco on 5/8/2023    Encounter Diagnosis   Name Primary?    Primary osteoarthritis of right knee Yes      Plan:  Restart visco in 4 weeks on right knee    Follow-up: 4 week or sooner if there are any problems between now and then.    Leave Review:   Google: Leave Google Review  Healthgrades: Leave Healthgrades Review    After Hours Number: [phone]

## 2023-09-12 NOTE — PROGRESS NOTES
Patient ID: Jeana Monsivais  YOB: 1950  MRN: [id number]    Chief Complaint: Pain of the Right Knee    Referred By: Previous patient    History of Present Illness: Jeana Monsivais is a  72 y.o. female   Data Unavailable with a chief complaint of Pain of the Right Knee    Presents today for recheck on right knee pain. She has a hx of left knee OA, she has had visco gel injections in the past last injection was on 5/8/2023. Patient states that she has started to feel pain on the inside of her right knee. Denies any fevers, chills, night sweats, numbness and tingling.     Pain  Associated symptoms include joint swelling. Pertinent negatives include no abdominal pain, chest pain, chills, coughing, fever, nausea, numbness, rash, sore throat or vomiting.       Past Medical History:   History reviewed. No pertinent past medical history.  Past Surgical History:   Procedure Laterality Date    BREAST LUMPECTOMY Left 2019    HYSTERECTOMY  1997     Family History   Problem Relation Age of Onset    Heart failure Mother     Heart failure Father     Hypertrophic cardiomyopathy Sister     Hypertrophic cardiomyopathy Brother      Social History     Socioeconomic History    Marital status:    Tobacco Use    Smoking status: Never    Smokeless tobacco: Never   Substance and Sexual Activity    Alcohol use: Not Currently    Drug use: Never    Sexual activity: Not Currently     Medication List with Changes/Refills   Current Medications    ANASTROZOLE (ARIMIDEX) 1 MG TAB    Take 1 mg by mouth once daily.    CYANOCOBALAMIN 1,000 MCG/ML INJECTION    Inject 1,000 mcg into the muscle every 30 days.    CYCLOBENZAPRINE (FLEXERIL) 5 MG TABLET    Take 5 mg by mouth 2 (two) times daily as needed.    DICLOFENAC SODIUM (VOLTAREN) 1 % GEL    Apply 2 g topically 3 (three) times daily.    ERGOCALCIFEROL (ERGOCALCIFEROL) 50,000 UNIT CAP    Take 1 capsule by mouth.    EZETIMIBE (ZETIA) 10 MG TABLET    Take 10 mg by mouth once  daily.    FENOFIBRATE 160 MG TAB    Take 160 mg by mouth once daily.    FEXOFENADINE (ALLEGRA) 180 MG TABLET    Take 180 mg by mouth once daily.    HYDROCHLOROTHIAZIDE (HYDRODIURIL) 25 MG TABLET    Take 25 mg by mouth once daily.    LISINOPRIL 10 MG TABLET    Take 10 mg by mouth once daily.    OLOPATADINE (PATANOL) 0.1 % OPHTHALMIC SOLUTION    Place 1 drop into both eyes 2 (two) times daily.    VITAMINS  A,C,E-ZINC-COPPER (PRESERVISION AREDS) 14,320-226-200 UNIT-MG-UNIT CAP    Take 1 tablet by mouth once a week.     Review of patient's allergies indicates:   Allergen Reactions    Erythromycin     Mycinette [cetylpyridinium-benzocaine] Diarrhea    Levaquin [levofloxacin] Nausea And Vomiting     Review of Systems   Constitutional: Negative for chills and fever.   HENT:  Negative for sore throat.    Eyes:  Negative for pain.   Cardiovascular:  Negative for chest pain and leg swelling.   Respiratory:  Negative for cough and shortness of breath.    Skin:  Negative for itching and rash.   Musculoskeletal:  Positive for joint pain and joint swelling.   Gastrointestinal:  Negative for abdominal pain, nausea and vomiting.   Genitourinary:  Negative for dysuria.   Neurological:  Negative for dizziness, numbness and paresthesias.       Physical Exam:   Body mass index is 39.07 kg/m².  There were no vitals filed for this visit.   GENERAL: Well appearing, appropriate for stated age, no acute distress.  CARDIOVASCULAR: Pulses regular by peripheral palpation.  PULMONARY: Respirations are even and non-labored.  NEURO: Awake, alert, and oriented x 3.  PSYCH: Mood & affect are appropriate.  HEENT: Head is normocephalic and atraumatic.  General    Nursing note and vitals reviewed.          Right Knee Exam   Right knee exam is normal.    Inspection   Effusion: absent    Tenderness   The patient is tender to palpation of the medial joint line.    Range of Motion   Extension:  0   Flexion:  120     Tests   Ligament Examination   Lachman:  normal (-1 to 2mm)   PCL-Posterior Drawer: normal (0 to 2mm)     MCL - Valgus: normal (0 to 2mm)  LCL - Varus: normal    Other   Sensation: normal    Left Knee Exam   Left knee exam is normal.    Inspection   Effusion: absent    Tenderness   The patient is experiencing no tenderness.     Range of Motion   Extension:  0   Flexion:  120     Tests   Stability   Lachman: normal (-1 to 2mm)   PCL-Posterior Drawer: normal (0 to 2mm)  MCL - Valgus: normal (0 to 2mm)  LCL - Varus: normal (0 to 2mm)    Other   Sensation: normal    Muscle Strength   Right Lower Extremity   Hip Abduction: 5/5   Quadriceps:  5/5   Hamstrin/5   Left Lower Extremity   Hip Abduction: 5/5   Quadriceps:  5/5   Hamstrin/5     Vascular Exam     Right Pulses  Dorsalis Pedis:      2+  Posterior Tibial:      2+        Left Pulses  Dorsalis Pedis:      2+  Posterior Tibial:      2+        All compartments are soft and compressible. Calf soft non-tender. Intact EHL, FHL, gastroc soleus, and tibialis anterior. Sensation intact to light touch in superficial peroneal, deep peroneal, tibial, sural, and saphenous nerve distributions. Foot warm and well perfused with capillary refill of less than 2 seconds and palpable pedal pulses.     Imaging:    X-ray Knee Ortho Right with Flexion  Narrative: EXAMINATION:  XR KNEE ORTHO RIGHT WITH FLEXION    CLINICAL HISTORY:  Pain in right knee    TECHNIQUE:  AP standing as well as PA flexion standing and Merchant views of both knees were performed.  A lateral view of the right knee is also performed.    COMPARISON:  2022    FINDINGS:  Mild osteoarthritis within the medial and patellofemoral compartments, grossly unchanged.  No acute fracture or dislocation.  Impression: As above.    Electronically signed by: Smith Gonzales  Date:    2023  Time:    14:20      Relevant imaging results reviewed and interpreted by me, discussed with the patient and / or family today.     Other Tests:     Patient  Instructions   Assessment:  Jeana Monsivais is a  72 y.o. female   Data Unavailable with a chief complaint of Pain of the Right Knee  Presents today for a recheck on right knee pain, with a hx of right knee OA.   Right knee OA   S/p right knee visco on 5/8/2023    Encounter Diagnosis   Name Primary?    Primary osteoarthritis of right knee Yes      Plan:  Restart visco in 4 weeks on right knee    Follow-up: 4 week or sooner if there are any problems between now and then.    Leave Review:   Google: Leave Google Review  Healthgrades: Leave Healthgrades Review    After Hours Number: [phone]      Provider Note/Medical Decision Making:   MEDICAL NECESSITY FOR VISCOSUPPLEMENTATION: After thorough evaluation of the patient, I have determined that visco-supplementation is medically necessary. The patient has painful DJD of the knee with failure of conservative therapy including lifestyle modifications and rehabilitation exercises. Oral analgesis/NSAIDs have not adequately controlled symptoms and there is radiographic evidence of joint space narrowing, subchondral sclerosis, and some early osteophytic changes Kellgren- Son grade 2 or greater, or in lack of radiographic evidence, there is arthroscopic or other evidence of chondrosis.      I discussed worrisome and red flag signs and symptoms with the patient. The patient expressed understanding and agreed to alert me immediately or to go to the emergency room if they experience any of these.   Treatment plan was developed with input from the patient/family, and they expressed understanding and agreement with the plan. All questions were answered today.        Disclaimer: This note was prepared using a voice recognition system and is likely to have sound alike errors within the text.

## 2023-10-16 ENCOUNTER — PROCEDURE VISIT (OUTPATIENT)
Dept: SPORTS MEDICINE | Facility: CLINIC | Age: 73
End: 2023-10-16
Payer: MEDICARE

## 2023-10-16 VITALS — WEIGHT: 242 LBS | BODY MASS INDEX: 38.89 KG/M2 | HEIGHT: 66 IN

## 2023-10-16 DIAGNOSIS — M17.11 PRIMARY OSTEOARTHRITIS OF RIGHT KNEE: Primary | ICD-10-CM

## 2023-10-16 PROCEDURE — 99999PBSHW PR PBB SHADOW TECHNICAL ONLY FILED TO HB: ICD-10-PCS | Mod: JZ,PBBFAC,,

## 2023-10-16 PROCEDURE — 99499 UNLISTED E&M SERVICE: CPT | Mod: S$PBB,,, | Performed by: PHYSICIAN ASSISTANT

## 2023-10-16 PROCEDURE — 99499 NO LOS: ICD-10-PCS | Mod: S$PBB,,, | Performed by: PHYSICIAN ASSISTANT

## 2023-10-16 PROCEDURE — 20610 DRAIN/INJ JOINT/BURSA W/O US: CPT | Mod: S$PBB,RT,, | Performed by: PHYSICIAN ASSISTANT

## 2023-10-16 PROCEDURE — 99999PBSHW PR PBB SHADOW TECHNICAL ONLY FILED TO HB: Mod: JZ,PBBFAC,,

## 2023-10-16 PROCEDURE — 20610 DRAIN/INJ JOINT/BURSA W/O US: CPT | Mod: PBBFAC,RT | Performed by: PHYSICIAN ASSISTANT

## 2023-10-16 PROCEDURE — 20610 LARGE JOINT ASPIRATION/INJECTION: R KNEE: ICD-10-PCS | Mod: S$PBB,RT,, | Performed by: PHYSICIAN ASSISTANT

## 2023-10-16 RX ADMIN — Medication 20 MG: at 11:10

## 2023-10-16 NOTE — PROCEDURES
Large Joint Aspiration/Injection: R knee    Date/Time: 10/16/2023 11:00 AM    Performed by: Joelle Castillo PA-C  Authorized by: Joelle Castillo PA-C    Consent Done?:  Yes (Verbal)  Indications:  Joint swelling and pain  Site marked: the procedure site was marked    Timeout: prior to procedure the correct patient, procedure, and site was verified    Prep: patient was prepped and draped in usual sterile fashion      Local anesthesia used?: Yes    Local anesthetic:  Topical anesthetic    Details:  Needle Size:  22 G  Ultrasonic Guidance for needle placement?: No    Approach:  Superior  Location:  Knee  Site:  R knee  Medications:  20 mg sodium hyaluronate (EUFLEXXA) 10 mg/mL(mw 2.4 -3.6 million)  Patient tolerance:  Patient tolerated the procedure well with no immediate complications     3/3

## 2023-10-23 ENCOUNTER — PROCEDURE VISIT (OUTPATIENT)
Dept: SPORTS MEDICINE | Facility: CLINIC | Age: 73
End: 2023-10-23
Payer: MEDICARE

## 2023-10-23 VITALS — HEIGHT: 66 IN | WEIGHT: 242.06 LBS | BODY MASS INDEX: 38.9 KG/M2

## 2023-10-23 DIAGNOSIS — M17.11 PRIMARY OSTEOARTHRITIS OF RIGHT KNEE: Primary | ICD-10-CM

## 2023-10-23 PROCEDURE — 99499 UNLISTED E&M SERVICE: CPT | Mod: S$PBB,,, | Performed by: PHYSICIAN ASSISTANT

## 2023-10-23 PROCEDURE — 20610 LARGE JOINT ASPIRATION/INJECTION: R KNEE: ICD-10-PCS | Mod: S$PBB,RT,, | Performed by: PHYSICIAN ASSISTANT

## 2023-10-23 PROCEDURE — 20610 DRAIN/INJ JOINT/BURSA W/O US: CPT | Mod: PBBFAC | Performed by: PHYSICIAN ASSISTANT

## 2023-10-23 PROCEDURE — 20610 DRAIN/INJ JOINT/BURSA W/O US: CPT | Mod: S$PBB,RT,, | Performed by: PHYSICIAN ASSISTANT

## 2023-10-23 PROCEDURE — 99999PBSHW PR PBB SHADOW TECHNICAL ONLY FILED TO HB: Mod: JZ,PBBFAC,,

## 2023-10-23 PROCEDURE — 99499 NO LOS: ICD-10-PCS | Mod: S$PBB,,, | Performed by: PHYSICIAN ASSISTANT

## 2023-10-23 PROCEDURE — 99999PBSHW PR PBB SHADOW TECHNICAL ONLY FILED TO HB: ICD-10-PCS | Mod: JZ,PBBFAC,,

## 2023-10-23 RX ADMIN — Medication 20 MG: at 11:10

## 2023-10-23 NOTE — PROCEDURES
Large Joint Aspiration/Injection: R knee    Date/Time: 10/23/2023 11:00 AM    Performed by: Joelle Castillo PA-C  Authorized by: Joelle Castillo PA-C    Consent Done?:  Yes (Verbal)  Indications:  Joint swelling and pain  Site marked: the procedure site was marked    Timeout: prior to procedure the correct patient, procedure, and site was verified    Prep: patient was prepped and draped in usual sterile fashion      Local anesthesia used?: Yes    Local anesthetic:  Topical anesthetic    Details:  Needle Size:  22 G  Ultrasonic Guidance for needle placement?: No    Approach:  Superior  Location:  Knee  Site:  R knee  Medications:  20 mg sodium hyaluronate (EUFLEXXA) 10 mg/mL(mw 2.4 -3.6 million)  Patient tolerance:  Patient tolerated the procedure well with no immediate complications     2/3

## 2023-10-30 ENCOUNTER — PROCEDURE VISIT (OUTPATIENT)
Dept: SPORTS MEDICINE | Facility: CLINIC | Age: 73
End: 2023-10-30
Payer: MEDICARE

## 2023-10-30 VITALS — HEIGHT: 66 IN | WEIGHT: 242 LBS | BODY MASS INDEX: 38.89 KG/M2

## 2023-10-30 DIAGNOSIS — M17.11 PRIMARY OSTEOARTHRITIS OF RIGHT KNEE: Primary | ICD-10-CM

## 2023-10-30 PROCEDURE — 20610 LARGE JOINT ASPIRATION/INJECTION: R KNEE: ICD-10-PCS | Mod: S$PBB,RT,, | Performed by: PHYSICIAN ASSISTANT

## 2023-10-30 PROCEDURE — 20610 DRAIN/INJ JOINT/BURSA W/O US: CPT | Mod: S$PBB,RT,, | Performed by: PHYSICIAN ASSISTANT

## 2023-10-30 PROCEDURE — 99499 UNLISTED E&M SERVICE: CPT | Mod: S$PBB,,, | Performed by: PHYSICIAN ASSISTANT

## 2023-10-30 PROCEDURE — 99499 NO LOS: ICD-10-PCS | Mod: S$PBB,,, | Performed by: PHYSICIAN ASSISTANT

## 2023-10-30 PROCEDURE — 99999PBSHW PR PBB SHADOW TECHNICAL ONLY FILED TO HB: ICD-10-PCS | Mod: JZ,PBBFAC,,

## 2023-10-30 PROCEDURE — 20610 DRAIN/INJ JOINT/BURSA W/O US: CPT | Mod: PBBFAC,RT | Performed by: PHYSICIAN ASSISTANT

## 2023-10-30 PROCEDURE — 99999PBSHW PR PBB SHADOW TECHNICAL ONLY FILED TO HB: Mod: JZ,PBBFAC,,

## 2023-10-30 RX ADMIN — Medication 20 MG: at 11:10

## 2023-10-30 NOTE — PROCEDURES
Large Joint Aspiration/Injection: R knee    Date/Time: 10/30/2023 11:00 AM    Performed by: Joelle Castillo PA-C  Authorized by: Joelle Castillo PA-C    Consent Done?:  Yes (Verbal)  Indications:  Joint swelling and pain  Site marked: the procedure site was marked    Timeout: prior to procedure the correct patient, procedure, and site was verified    Prep: patient was prepped and draped in usual sterile fashion      Local anesthesia used?: Yes    Local anesthetic:  Topical anesthetic    Details:  Needle Size:  22 G  Ultrasonic Guidance for needle placement?: No    Approach:  Superior  Location:  Knee  Site:  R knee  Medications:  20 mg sodium hyaluronate (EUFLEXXA) 10 mg/mL(mw 2.4 -3.6 million)  Patient tolerance:  Patient tolerated the procedure well with no immediate complications     3/3

## 2024-03-07 ENCOUNTER — HOSPITAL ENCOUNTER (OUTPATIENT)
Dept: RADIOLOGY | Facility: HOSPITAL | Age: 74
Discharge: HOME | End: 2024-03-07
Attending: STUDENT IN AN ORGANIZED HEALTH CARE EDUCATION/TRAINING PROGRAM
Payer: MEDICARE

## 2024-03-07 ENCOUNTER — OFFICE VISIT (OUTPATIENT)
Dept: ORTHOPEDICS | Facility: CLINIC | Age: 74
End: 2024-03-07
Payer: MEDICARE

## 2024-03-07 VITALS — HEIGHT: 66 IN | WEIGHT: 242 LBS | BODY MASS INDEX: 38.89 KG/M2

## 2024-03-07 DIAGNOSIS — M25.512 LEFT SHOULDER PAIN, UNSPECIFIED CHRONICITY: ICD-10-CM

## 2024-03-07 DIAGNOSIS — M25.512 LEFT SHOULDER PAIN, UNSPECIFIED CHRONICITY: Primary | ICD-10-CM

## 2024-03-07 DIAGNOSIS — M62.9 MUSCULOSKELETAL DISORDER INVOLVING UPPER TRAPEZIUS MUSCLE: Primary | ICD-10-CM

## 2024-03-07 DIAGNOSIS — M75.32 CALCIFIC TENDINITIS OF LEFT SHOULDER: ICD-10-CM

## 2024-03-07 PROCEDURE — 73030 X-RAY EXAM OF SHOULDER: CPT | Mod: 26,LT,, | Performed by: RADIOLOGY

## 2024-03-07 PROCEDURE — 99213 OFFICE O/P EST LOW 20 MIN: CPT | Mod: PBBFAC,25,PO | Performed by: STUDENT IN AN ORGANIZED HEALTH CARE EDUCATION/TRAINING PROGRAM

## 2024-03-07 PROCEDURE — 99214 OFFICE O/P EST MOD 30 MIN: CPT | Mod: S$PBB,,, | Performed by: STUDENT IN AN ORGANIZED HEALTH CARE EDUCATION/TRAINING PROGRAM

## 2024-03-07 PROCEDURE — 73030 X-RAY EXAM OF SHOULDER: CPT | Mod: TC,PO,LT

## 2024-03-07 PROCEDURE — 99999 PR PBB SHADOW E&M-EST. PATIENT-LVL III: CPT | Mod: PBBFAC,,, | Performed by: STUDENT IN AN ORGANIZED HEALTH CARE EDUCATION/TRAINING PROGRAM

## 2024-03-07 NOTE — PROGRESS NOTES
"        Patient ID: Jeana Monsivais  YOB: 1950  MRN: [id number]    Chief Complaint: Pain of the Left Shoulder    Referred By: Self for left shoulder    History of Present Illness: Jeana Monsivais is a right-hand dominant 73 y.o. female who presents today with left shoulder pain.     The patient is active in  walk, swimming .  Occupation: retired teacher    Jeana Monsivais states it is Acute on chronic in nature and there was not a specific mechanism. Long standing shoulder pain dating back some years. Previously played high amount of golf,   tennis and softball. Notes some prior golf injury to left shoulder over 5 years ago. More recently developed acute upper trap and upper arm pain within the last week.  Jeana Monsivais describes the pain as a continuous. Current pain level at rest is 8/10, pain level at worst is 10/10  (Numeric Pain Rating Scale).  Associated symptoms include: Swelling No, Instability No, Pain that affects your sleep Yes, Mechanical Yes, locking/catching No, Neurological Yes, limited range of motion Yes.They denies formal physical therapy for this. Left sided breast cancer with breat lumpectomy with left sided lymph nodes.    No results found for: "HGBA1C"      Past Medical History:   History reviewed. No pertinent past medical history.  Past Surgical History:   Procedure Laterality Date    BREAST LUMPECTOMY Left 2019    HYSTERECTOMY  1997     Family History   Problem Relation Age of Onset    Heart failure Mother     Heart failure Father     Hypertrophic cardiomyopathy Sister     Hypertrophic cardiomyopathy Brother      Social History     Socioeconomic History    Marital status:    Tobacco Use    Smoking status: Never    Smokeless tobacco: Never   Substance and Sexual Activity    Alcohol use: Not Currently    Drug use: Never    Sexual activity: Not Currently     Medication List with Changes/Refills   Current Medications    ANASTROZOLE (ARIMIDEX) 1 MG TAB    Take 1 mg by mouth once " daily.    CYANOCOBALAMIN 1,000 MCG/ML INJECTION    Inject 1,000 mcg into the muscle every 30 days.    CYCLOBENZAPRINE (FLEXERIL) 5 MG TABLET    Take 5 mg by mouth 2 (two) times daily as needed.    DICLOFENAC SODIUM (VOLTAREN) 1 % GEL    Apply 2 g topically 3 (three) times daily.    EZETIMIBE (ZETIA) 10 MG TABLET    Take 10 mg by mouth once daily.    FENOFIBRATE 160 MG TAB    Take 160 mg by mouth once daily.    FEXOFENADINE (ALLEGRA) 180 MG TABLET    Take 180 mg by mouth once daily.    HYDROCHLOROTHIAZIDE (HYDRODIURIL) 25 MG TABLET    Take 25 mg by mouth once daily.    LISINOPRIL 10 MG TABLET    Take 10 mg by mouth once daily.    OLOPATADINE (PATANOL) 0.1 % OPHTHALMIC SOLUTION    Place 1 drop into both eyes 2 (two) times daily.    VITAMINS  A,C,E-ZINC-COPPER (PRESERVISION AREDS) 14,320-226-200 UNIT-MG-UNIT CAP    Take 1 tablet by mouth once a week.     Review of patient's allergies indicates:   Allergen Reactions    Erythromycin     Mycinette [cetylpyridinium-benzocaine] Diarrhea    Levaquin [levofloxacin] Nausea And Vomiting       Physical Exam:   Body mass index is 39.06 kg/m².    GENERAL: Well appearing, in no acute distress.  HEAD: Normocephalic and atraumatic.  ENT: External ears and nose grossly normal.  EYES: EOMI bilaterally  PULMONARY: Respirations are grossly even and non-labored.  NEURO: Awake, alert, and oriented x 3.  SKIN: No obvious rashes appreciated.  PSYCH: Mood & affect are appropriate.    Detailed MSK exam:     ROM-  R shoulder: flexion 175, abduction 170, ER at 90 55, IR L3  L shoulder: flexion 180, abduction 180, ER at 90 55, IR L3  Resisted strength-   Flexion 5/5, ER 5/5, IR 5/5, extension 5/5    Tenderness to palpation: AC negative, long head biceps tendon negative, anterior capsule negative, posterior capsule negative, biceps negative, rotator cuff positive, upper trapezius positive with triggers    Neers positive, Montalvo Devyn in scaption negative, Montalvo Devyn in cross body negative,  Cross-body adduction negative, resisted extension negative, Obriens negative, Speeds negative, Empty can (resisted scaption) positive for pain decent strength, Full can (resisted scaption) positive pain decent strength,, resisted abduction negative, Belly press negative, lift-off negative,     Imaging:    X-Ray Shoulder 2 or More Views Left  Narrative: EXAM: XR SHOULDER COMPLETE 2 OR MORE VIEWS LEFT    CLINICAL HISTORY: Left shoulder pain    FINDINGS:  5 views.  No fractures or dislocations.  There appears to be calcific bursitis.  Impression:  Calcific bursitis    Finalized on: 3/7/2024 10:49 AM By:  Tereso Land MD  [facility]# [id number]      2024-03-07 10:51:12.006    [facility]      Relevant imaging results were reviewed and interpreted by me and per my read large calcific density in the rotator cuff tendon noted.  This was discussed with the patient and / or family today.     Assessment:  Jeana Monsivais is a 73 y.o. female presents today for left upper trap and multiple trigger points in the paraspinals and upper trapezius left shoulder.  She does have underlying calcific tendinitis but does not appear to be as symptomatic from that today as she does her upper trapezius.  We did discuss that it is possible that her upper trap has become more painful secondary to try and protect her shoulder but due to the symptoms she is having right now and upper trapezius I discussed oral anti-inflammatories possible muscle relaxants she would like to hold off discussed dry needling and manual massage as well as scapular control and strengthening in the meantime she is open to this.  We will start her in physical therapy as soon as available with [facility] at [facility].  Order placed today.  Will follow-up accordingly 4-6 weeks for reexamination.    Musculoskeletal disorder involving upper trapezius muscle  -     Ambulatory referral/consult to Physical/Occupational Therapy; Future; Expected date: 03/14/2024    Calcific tendinitis of left  shoulder         A copy of today's visit note has been sent to the referring provider.       Pilo Rangel MD    Disclaimer: This note was prepared using a voice recognition system and is likely to have sound alike errors within the text.

## 2024-03-07 NOTE — PATIENT INSTRUCTIONS
Assessment:  Jeana Monsivais is a 73 y.o. female   Chief Complaint   Patient presents with    Left Shoulder - Pain       Encounter Diagnoses   Name Primary?    Musculoskeletal disorder involving upper trapezius muscle Yes    Calcific tendinitis of left shoulder         Plan:  Reviewed your x-rays with you today and discussed pertinent findings.   We have reviewed the natural history of this disorder and discussed treatment and management options moving forward   Dry needling to upper trap  Muscle relaxer, steroid dose pack as needed  Advil in short term  Address upper trap tightness with dry needle and manual therapy  If this does not address the shoulder pain, will target treatment at the calcific tendinitis with corticosteroid injection or percutaneous tenotomy   If medication is needed, reach out   An external, ambulatory referral to physical therapy was placed today. Please reach out to them to schedule your initial consult and subsequent visits.     Follow-up: 4-6 weeks or sooner if there are any problems between now and then.    Thank you for choosing [facility] and Dr. Pilo Rangel for your orthopedic & sports medicine care. It is our goal to provide you with exceptional care that will help keep you healthy, active, and get you back in the game.    Please do not hesitate to reach out to us via email, phone, or MyChart with any questions, concerns, or feedback.    If you felt that you received exemplary care today, please consider leaving us feedback on Healthgrades at:  [email or web address]    If you are experiencing pain/discomfort ,or have questions after 5pm and would like to be connected to the [facility] on-call team, please call this number and specify which Sports Medicine provider is treating you: [phone]

## 2024-03-13 ENCOUNTER — TELEPHONE (OUTPATIENT)
Dept: SPORTS MEDICINE | Facility: CLINIC | Age: 74
End: 2024-03-13
Payer: COMMERCIAL

## 2024-03-19 ENCOUNTER — TELEPHONE (OUTPATIENT)
Dept: ORTHOPEDICS | Facility: CLINIC | Age: 74
End: 2024-03-19
Payer: COMMERCIAL

## 2024-03-19 DIAGNOSIS — M79.641 RIGHT HAND PAIN: Primary | ICD-10-CM

## 2024-03-19 NOTE — TELEPHONE ENCOUNTER
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 Ps call

## 2024-03-19 NOTE — TELEPHONE ENCOUNTER
Returned call to pt, states having pain & numbness in right hand, scheduled appt for 3/20/24 @ 1 per her req

## 2024-03-20 ENCOUNTER — HOSPITAL ENCOUNTER (OUTPATIENT)
Dept: RADIOLOGY | Facility: HOSPITAL | Age: 74
Discharge: HOME | End: 2024-03-20
Attending: STUDENT IN AN ORGANIZED HEALTH CARE EDUCATION/TRAINING PROGRAM
Payer: MEDICARE

## 2024-03-20 ENCOUNTER — OFFICE VISIT (OUTPATIENT)
Dept: ORTHOPEDICS | Facility: CLINIC | Age: 74
End: 2024-03-20
Payer: MEDICARE

## 2024-03-20 VITALS — WEIGHT: 242.06 LBS | HEIGHT: 66 IN | BODY MASS INDEX: 38.9 KG/M2

## 2024-03-20 DIAGNOSIS — M79.641 RIGHT HAND PAIN: ICD-10-CM

## 2024-03-20 DIAGNOSIS — G56.01 CARPAL TUNNEL SYNDROME OF RIGHT WRIST: Primary | ICD-10-CM

## 2024-03-20 PROCEDURE — 99214 OFFICE O/P EST MOD 30 MIN: CPT | Mod: PBBFAC,25 | Performed by: STUDENT IN AN ORGANIZED HEALTH CARE EDUCATION/TRAINING PROGRAM

## 2024-03-20 PROCEDURE — 99999 PR PBB SHADOW E&M-EST. PATIENT-LVL IV: CPT | Mod: PBBFAC,,, | Performed by: STUDENT IN AN ORGANIZED HEALTH CARE EDUCATION/TRAINING PROGRAM

## 2024-03-20 PROCEDURE — 99204 OFFICE O/P NEW MOD 45 MIN: CPT | Mod: S$PBB,,, | Performed by: STUDENT IN AN ORGANIZED HEALTH CARE EDUCATION/TRAINING PROGRAM

## 2024-03-20 PROCEDURE — 73130 X-RAY EXAM OF HAND: CPT | Mod: TC,RT

## 2024-03-20 PROCEDURE — 73130 X-RAY EXAM OF HAND: CPT | Mod: 26,RT,, | Performed by: RADIOLOGY

## 2024-03-20 RX ORDER — ERGOCALCIFEROL 1.25 MG/1
50000 CAPSULE ORAL
COMMUNITY
Start: 2024-03-03

## 2024-03-21 RX ORDER — SODIUM CHLORIDE 9 MG/ML
INJECTION, SOLUTION INTRAVENOUS CONTINUOUS
Status: CANCELLED | OUTPATIENT
Start: 2024-03-21

## 2024-03-21 RX ORDER — LIDOCAINE HYDROCHLORIDE 10 MG/ML
1 INJECTION, SOLUTION EPIDURAL; INFILTRATION; INTRACAUDAL; PERINEURAL ONCE
Status: CANCELLED | OUTPATIENT
Start: 2024-03-21 | End: 2024-03-21

## 2024-03-21 RX ORDER — CEFAZOLIN SODIUM 2 G/50ML
2 SOLUTION INTRAVENOUS
Status: CANCELLED | OUTPATIENT
Start: 2024-03-21

## 2024-03-21 NOTE — PROGRESS NOTES
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 She is Dr. Sarah's former  at [facility] [employer].  Her pain level is a 2/10 at baseline but a 10/10 at night.  Patient experiences pain and numbness which wakes her from sleep on a daily basis.  No history of diabetes.  Patient was not on blood thinners.  Patient states that the middle finger we will intermittently become numb during the daytime as well.  Of note, she does have a history of a softball injury to the right middle finger as a teenager.  That being said the numbness in her middle finger started in the last few weeks.  Patient has tried and wearing a brace at night with some improvement in her symptoms so they have persisted.  Patient will sometimes experience weakness as well.  No neck pain.  No history of dropping things.    Review of Systems  Review of systems negative for chest pain, shortness of breath, fevers, chills, nausea/vomiting.    Past Medical History  History reviewed. No pertinent past medical history.    Past Surgical History  Past Surgical History:   Procedure Laterality Date    BREAST LUMPECTOMY Left 2019    HYSTERECTOMY  1997       Allergies  Review of patient's allergies indicates:   Allergen Reactions    Erythromycin     Mycinette [cetylpyridinium-benzocaine] Diarrhea    Levaquin [levofloxacin] Nausea And Vomiting       Family History  Family History   Problem Relation Age of Onset    Heart failure Mother     Heart failure Father     Hypertrophic cardiomyopathy Sister     Hypertrophic cardiomyopathy Brother        Social History  Social History     Socioeconomic History    Marital status:    Tobacco Use    Smoking status: Never     Passive exposure: Past    Smokeless tobacco: Never   Substance and  Sexual Activity    Alcohol use: Not Currently    Drug use: Never    Sexual activity: Not Currently       Vital Signs  There were no vitals filed for this visit.    Physical Exam  Constitutional: Appears well-developed and well-nourished. No distress.   HENT:   Head: Normocephalic.   Eyes: EOM are normal.   Pulmonary/Chest: Effort normal.   Neurological: Oriented to person, place, and time.   Psychiatric: Normal mood and affect.     Right Upper Extremity:  No abrasions, lacerations, wounds.  No swelling.  No erythema.  Full active wrist flexion and extension.  Full pronation and supination. Positive Tinel's in the median nerve distribution at the wrist.  Positive Phalen's.  Negative Tinel's in the ulnar nerve distribution at the elbow.  No ulnar nerve subluxation with elbow flexion.  No thenar or FDI atrophy.  5/5 apb strength.  5/5 FDI strength.  Two-point discrimination is 5 mm in all 5 fingers.  Patient is able to make a fist and extend all her fingers.  No tenderness over the A1 pulleys of all 5 fingers.  No triggering with range of motion.  Palpable radial pulse. Negative spurlings. Negative Hoffmans. Normoreflexic biceps, triceps, brachioradialis.      Imaging  Right-hand x-rays three views were obtained today and independently reviewed by myself.  Mild arthritic changes are noted at the thumb CMC joint with a associated osteophyte formation.  Early arthritic changes are noted at the DIPJ joints of the index, middle, and small fingers.  No fracture.  No dislocation or subluxation.  No foreign body.    Assessment and Plan  73-year-old right-hand dominant female presents with findings of right carpal tunnel syndrome.  I discussed the natural history of carpal tunnel syndrome with the patient.  I also provided patient with a handout to read further on the subject.  I discussed potential treatment options including bracing, injections, and surgery.  Patient was already been trying a brace at night with some  improvement in her symptoms.  Patient would rather have this problem addressed and fixed than prolong her symptoms by undergoing injections.  Risks and benefits of right carpal tunnel release surgery were discussed with the patient.    In particular, it was noted that carpal tunnel release surgery outcomes can be unpredictable.  Specifically, it was noted that numbness in the fingers may never resolve or may take more than a year to achieve gradual (and often incomplete) improvement.  It was noted that sometimes the only benefit of the surgery is to prevent significant worsening of weakness or symptoms of nerve compression.  It was noted that any muscular weakness or atrophy due to long-standing carpal tunnel syndrome is generally expected not to improve after the surgery.  It was noted that sometimes re-operation is needed to address new symptoms after the surgery or complications such as damage to normal structures within the field of surgery.  It was noted that normal structures within the field of surgery that can be damaged include the median nerve and the nine flexor tendons to the fingers.     Patient elected to proceed with surgery and consent was obtained for right carpal tunnel release.  I discussed anesthetic options including MAC/local versus local only.  Patient elected for local only anesthetic.  Patient was booked for surgery on 04/19/2024.    Lela Garces MD  Orthopaedic Hand Surgery

## 2024-03-21 NOTE — H&P (VIEW-ONLY)
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 She is Dr. Sarah's former  at [employer].  Her pain level is a 2/10 at baseline but a 10/10 at night.  Patient experiences pain and numbness which wakes her from sleep on a daily basis.  No history of diabetes.  Patient was not on blood thinners.  Patient states that the middle finger we will intermittently become numb during the daytime as well.  Of note, she does have a history of a softball injury to the right middle finger as a teenager.  That being said the numbness in her middle finger started in the last few weeks.  Patient has tried and wearing a brace at night with some improvement in her symptoms so they have persisted.  Patient will sometimes experience weakness as well.  No neck pain.  No history of dropping things.    Review of Systems  Review of systems negative for chest pain, shortness of breath, fevers, chills, nausea/vomiting.    Past Medical History  History reviewed. No pertinent past medical history.    Past Surgical History  Past Surgical History:   Procedure Laterality Date    BREAST LUMPECTOMY Left 2019    HYSTERECTOMY  1997       Allergies  Review of patient's allergies indicates:   Allergen Reactions    Erythromycin     Mycinette [cetylpyridinium-benzocaine] Diarrhea    Levaquin [levofloxacin] Nausea And Vomiting       Family History  Family History   Problem Relation Age of Onset    Heart failure Mother     Heart failure Father     Hypertrophic cardiomyopathy Sister     Hypertrophic cardiomyopathy Brother        Social History  Social History     Socioeconomic History    Marital status:    Tobacco Use    Smoking status: Never     Passive exposure: Past    Smokeless tobacco: Never   Substance and  Sexual Activity    Alcohol use: Not Currently    Drug use: Never    Sexual activity: Not Currently       Vital Signs  There were no vitals filed for this visit.    Physical Exam  Constitutional: Appears well-developed and well-nourished. No distress.   HENT:   Head: Normocephalic.   Eyes: EOM are normal.   Pulmonary/Chest: Effort normal.   Neurological: Oriented to person, place, and time.   Psychiatric: Normal mood and affect.     Right Upper Extremity:  No abrasions, lacerations, wounds.  No swelling.  No erythema.  Full active wrist flexion and extension.  Full pronation and supination. Positive Tinel's in the median nerve distribution at the wrist.  Positive Phalen's.  Negative Tinel's in the ulnar nerve distribution at the elbow.  No ulnar nerve subluxation with elbow flexion.  No thenar or FDI atrophy.  5/5 apb strength.  5/5 FDI strength.  Two-point discrimination is 5 mm in all 5 fingers.  Patient is able to make a fist and extend all her fingers.  No tenderness over the A1 pulleys of all 5 fingers.  No triggering with range of motion.  Palpable radial pulse. Negative spurlings. Negative Hoffmans. Normoreflexic biceps, triceps, brachioradialis.      Imaging  Right-hand x-rays three views were obtained today and independently reviewed by myself.  Mild arthritic changes are noted at the thumb CMC joint with a associated osteophyte formation.  Early arthritic changes are noted at the DIPJ joints of the index, middle, and small fingers.  No fracture.  No dislocation or subluxation.  No foreign body.    Assessment and Plan  73-year-old right-hand dominant female presents with findings of right carpal tunnel syndrome.  I discussed the natural history of carpal tunnel syndrome with the patient.  I also provided patient with a handout to read further on the subject.  I discussed potential treatment options including bracing, injections, and surgery.  Patient was already been trying a brace at night with some  improvement in her symptoms.  Patient would rather have this problem addressed and fixed than prolong her symptoms by undergoing injections.  Risks and benefits of right carpal tunnel release surgery were discussed with the patient.    In particular, it was noted that carpal tunnel release surgery outcomes can be unpredictable.  Specifically, it was noted that numbness in the fingers may never resolve or may take more than a year to achieve gradual (and often incomplete) improvement.  It was noted that sometimes the only benefit of the surgery is to prevent significant worsening of weakness or symptoms of nerve compression.  It was noted that any muscular weakness or atrophy due to long-standing carpal tunnel syndrome is generally expected not to improve after the surgery.  It was noted that sometimes re-operation is needed to address new symptoms after the surgery or complications such as damage to normal structures within the field of surgery.  It was noted that normal structures within the field of surgery that can be damaged include the median nerve and the nine flexor tendons to the fingers.     Patient elected to proceed with surgery and consent was obtained for right carpal tunnel release.  I discussed anesthetic options including MAC/local versus local only.  Patient elected for local only anesthetic.  Patient was booked for surgery on 04/19/2024.    Llea Garces MD  Orthopaedic Hand Surgery

## 2024-03-22 ENCOUNTER — OFFICE VISIT (OUTPATIENT)
Dept: SPORTS MEDICINE | Facility: CLINIC | Age: 74
End: 2024-03-22
Payer: MEDICARE

## 2024-03-22 VITALS — WEIGHT: 242.06 LBS | BODY MASS INDEX: 38.9 KG/M2 | HEIGHT: 66 IN

## 2024-03-22 DIAGNOSIS — M17.11 PRIMARY OSTEOARTHRITIS OF RIGHT KNEE: Primary | ICD-10-CM

## 2024-03-22 PROCEDURE — 99213 OFFICE O/P EST LOW 20 MIN: CPT | Mod: PBBFAC | Performed by: STUDENT IN AN ORGANIZED HEALTH CARE EDUCATION/TRAINING PROGRAM

## 2024-03-22 PROCEDURE — G2211 COMPLEX E/M VISIT ADD ON: HCPCS | Mod: S$PBB,,, | Performed by: STUDENT IN AN ORGANIZED HEALTH CARE EDUCATION/TRAINING PROGRAM

## 2024-03-22 PROCEDURE — 99214 OFFICE O/P EST MOD 30 MIN: CPT | Mod: S$PBB,,, | Performed by: STUDENT IN AN ORGANIZED HEALTH CARE EDUCATION/TRAINING PROGRAM

## 2024-03-22 PROCEDURE — 99999 PR PBB SHADOW E&M-EST. PATIENT-LVL III: CPT | Mod: PBBFAC,,, | Performed by: STUDENT IN AN ORGANIZED HEALTH CARE EDUCATION/TRAINING PROGRAM

## 2024-03-22 NOTE — PATIENT INSTRUCTIONS
Assessment:  Jeana Monsivais is a 73 y.o. female   Chief Complaint   Patient presents with    Right Knee - Pain, Post-op Evaluation       Encounter Diagnosis   Name Primary?    Primary osteoarthritis of right knee Yes        Plan:  Placed a medication referral for visco-supplementation injections. This is a series of three injections over three weeks. In this procedure, a gel-like fluid called hyaluronic acid is injected into the knee joint. Hyaluronic acid is a naturally occurring substance found in the synovial fluid surrounding joints. People with osteoarthritis have a lower-than-normal concentration of hyaluronic acid in their joints The theory is that adding hyaluronic acid to the arthritic joint will facilitate movement and reduce pain through a cellular level of improvement. The receptors within the knee are then down regulated to reduce the sensitivity and there is a greater stimulation production of hyaluronic acid.   We have reviewed the natural history of this disorder and discussed treatment and management options moving forward   -maintain healthy weight (every pound is 4 pounds of pressure on the knee)    -daily moderate exercise (walk, bike, swim 30 minutes per day) to keep joints moving    -daily strengthening exercises (through therapy or on own) to keep muscles supporting joint healthy and strong    -glucosamine 1500mg daily (look for USP label on bottle)    -tylenol as needed for pain (follow directions on the bottle)    -anti-inflammatory medication such as alleve may be helpful- take 1-2 tabs twice daily for 7 days. If it helps your pain, continue. If you do not feel any change, you may stop and then take it as needed.    (you may be given a once daily anti-inflammatory such as MOBIC. If given, avoid other anti-inflammatory medications such as advil, ibuprofen, motrin, naprosyn, alleve, etc)    -if swollen and painful, ice, decrease activity, and take anti-inflammatory daily for 5-7 days and if no  relief call your doctor for further options    -consider cortisone injection (every 3-4 months at most)- anti- inflammatory steroid medication that can be injected directly into the joint to reduce inflammation    -consider hyaluronic acid injections (eufflexxa, hyalgan, synvisc, supartz) (every 6 months at most)- protein injection that helps decrease pain and irritability in the joint. It is best used to help prolong intervals between cortisone injections to minimize steroid injections. These are currently approved for knee injections. Discuss with your doctor if other joint involved. Call to seek approval prior to the injections.    -long-term treatment may include a total joint replacement (keep diary of good days and bad days, then evaluate as to when you are ready)     General Arthritis info:    -shiny white stuff at end of a chicken bones is cartilage    -arthritis is wearing away of the cartilage that lines the end of your bones    -osteoarthritis is thought to be a wear and tear phenomenon    -symptoms are due to inflammation of joint causing stiffness, aching, and sometimes swelling    -occasionally sharp pain will occur causing a give way sensation    -Risk factors: genetic, weight, female > male, age    Supplements for pain, inflammation, arthritis.    Glucosamine sulfate/chondroitin sulfate has been shown to be safe and effective for knee arthritis (and possibly other joints affected with arthritis). This is an over the counter medication/supplement and usually needs to be taken for 1-2 months before results are seen. If you do not see any results after this time, consider stopping it. The dose is usually found on the bottle, and is 1500mg to start (first 1-2 months) then you can back down to 1000 mg (current recommendations are 1500mg per day, all at once).    Some people can have stomach problems with this and if you do, you may stop taking it or divide up the doses. If you are ALLERGIC TO SHELLFISH,  please do not take. Follow the instructions on the bottle.    Other supplements that have been shown to help with joint and muscle pain include:    (Unless otherwise noted, as these supplements are not FDA controlled, please follow the recommendations on the bottle)    Turmeric 500mg PO BID    Flax seed oil    Avocado soybean unsaponifiables    PAULIE-e    MSM    Cherry juice extract (tart)    Rosehip    Anikinara    Diacerein    If there are any concerns about taking these supplements with other medications you may already be taking, you can speak to your pharmacist, physician, health care provider, or research other medical information available to you. Side effects and interactions are possible with any supplement or medication - be safe!     Follow-up: Week first of May or sooner if there are any problems between now and then.    Thank you for choosing [facility] and Dr. Pilo Rangel for your orthopedic & sports medicine care. It is our goal to provide you with exceptional care that will help keep you healthy, active, and get you back in the game.    Please do not hesitate to reach out to us via email, phone, or MyChart with any questions, concerns, or feedback.    If you felt that you received exemplary care today, please consider leaving us feedback on Healthgrades at:  [email or web address]    If you are experiencing pain/discomfort ,or have questions after 5pm and would like to be connected to the [facility] on-call team, please call this number and specify which Sports Medicine provider is treating you: [phone]

## 2024-03-22 NOTE — PROGRESS NOTES
Patient ID: Jeana Monsivais  YOB: 1950  MRN: [id number]    Chief Complaint: Pain and Post-op Evaluation of the Right Knee      History of Present Illness: Jeana Monsivais is a 73-year-old female presenting today for right knee pain.  Status post Visco 5 months ago requesting repeat injections.  No fall injury trauma.  Would like to do them in central.    Past Medical History:   History reviewed. No pertinent past medical history.  Past Surgical History:   Procedure Laterality Date    BREAST LUMPECTOMY Left 2019    HYSTERECTOMY  1997     Family History   Problem Relation Age of Onset    Heart failure Mother     Heart failure Father     Hypertrophic cardiomyopathy Sister     Hypertrophic cardiomyopathy Brother      Social History     Socioeconomic History    Marital status:    Tobacco Use    Smoking status: Never     Passive exposure: Past    Smokeless tobacco: Never   Substance and Sexual Activity    Alcohol use: Not Currently    Drug use: Never    Sexual activity: Not Currently     Medication List with Changes/Refills   Current Medications    ANASTROZOLE (ARIMIDEX) 1 MG TAB    Take 1 mg by mouth once daily.    CYANOCOBALAMIN 1,000 MCG/ML INJECTION    Inject 1,000 mcg into the muscle every 30 days.    CYCLOBENZAPRINE (FLEXERIL) 5 MG TABLET    Take 5 mg by mouth 2 (two) times daily as needed.    DICLOFENAC SODIUM (VOLTAREN) 1 % GEL    Apply 2 g topically 3 (three) times daily.    ERGOCALCIFEROL (ERGOCALCIFEROL) 50,000 UNIT CAP    Take 50,000 Units by mouth every 7 days.    EZETIMIBE (ZETIA) 10 MG TABLET    Take 10 mg by mouth once daily.    FENOFIBRATE 160 MG TAB    Take 160 mg by mouth once daily.    FEXOFENADINE (ALLEGRA) 180 MG TABLET    Take 180 mg by mouth once daily.    HYDROCHLOROTHIAZIDE (HYDRODIURIL) 25 MG TABLET    Take 25 mg by mouth once daily.    LISINOPRIL 10 MG TABLET    Take 10 mg by mouth once daily.    OLOPATADINE (PATANOL) 0.1 % OPHTHALMIC SOLUTION    Place 1 drop into both  eyes 2 (two) times daily.    VITAMINS  A,C,E-ZINC-COPPER (PRESERVISION AREDS) 14,[id number] UNIT-MG-UNIT CAP    Take 1 tablet by mouth once a week.     Review of patient's allergies indicates:   Allergen Reactions    Erythromycin     Mycinette [cetylpyridinium-benzocaine] Diarrhea    Levaquin [levofloxacin] Nausea And Vomiting       Physical Exam:   Body mass index is 39.07 kg/m².    GENERAL: Well appearing, in no acute distress.  HEAD: Normocephalic and atraumatic.  ENT: External ears and nose grossly normal.  EYES: EOMI bilaterally  PULMONARY: Respirations are grossly even and non-labored.  NEURO: Awake, alert, and oriented x 3.  SKIN: No obvious rashes appreciated.  PSYCH: Mood & affect are appropriate.    Detailed MSK exam:     Deferred    Imaging:    No new imaging    Assessment:  Jeana Monsivais is a 73 y.o. female presents today for right knee pain consistent with primary osteoarthritis of knee.  Requesting repeat Visco.  We will do an central per patient request.  Follow-up for Visco right knee.  Also discussed appropriate follow-up for left shoulder pain at that time.  Continue with PT until then.    MEDICAL NECESSITY FOR VISCOSUPPLEMETNATION: After thorough evaluation of the patient, I have determined that visco-supplementation is medically necessary. The patient has painful degenerative changes of the knee with failure of conservative treatments including lifestyle modifications and rehabilitation exercises.  Oral analgesis/NSAIDs have not adequately controlled symptoms and there is radiographic evidence of Kellgren Son grade 2 or greater osteoarthritic changes, or in lack of radiographic evidence, there is arthroscopic or other evidence of chondrosis.     Today's visit is associated with current or anticipated ongoing medical care related to this patient's diagnosis of osteoarthritis.  Currently there is no cure of osteoarthritis and the patient will require regular follow up to manage symptoms and  progression.  The patient is to return to the office within a minimum of 3-6 months to review symptoms and function at that time.   CPT code       There are no diagnoses linked to this encounter.       Pilo Rangel MD    Disclaimer: This note was prepared using a voice recognition system and is likely to have sound alike errors within the text.

## 2024-04-12 DIAGNOSIS — M17.11 PRIMARY OSTEOARTHRITIS OF RIGHT KNEE: Primary | ICD-10-CM

## 2024-04-15 ENCOUNTER — PATIENT MESSAGE (OUTPATIENT)
Dept: PREADMISSION TESTING | Facility: HOSPITAL | Age: 74
End: 2024-04-15
Payer: COMMERCIAL

## 2024-04-15 RX ORDER — CETIRIZINE HYDROCHLORIDE 10 MG/1
10 TABLET ORAL DAILY
COMMUNITY

## 2024-04-17 NOTE — DISCHARGE INSTRUCTIONS
Nozin Instructions  Goal: the goal of Nozin is to reduce the risk of post-procedural infections by bacteria in the nasal cavity. Think of it as hand  for your nose.    How to use:    1. Shake Nozin bottle well    2. Take a cotton swab and apply 4 drops to one tip    3. Insert cotton tip into one nostril, being sure not to go deeper into nose than tip of the swab.    4. Swab nostril 6 times counterclockwise and 6 times clockwise. Make sure to swab the inside front pocket of the nostril.    5. Take swab out and apply 2 drops to the same cotton tip. Repeat steps 3 and 4 in the other nostril.        Do steps 1-5 twice a day for 7 days.          After Hand Surgery  After surgery, the better you take care of yourself--especially your hand--the sooner it will heal. Follow your surgeons instructions. Try not to bump your hand, and dont move or lift anything while youre still wearing bandages, a splint, or a cast.  Care for your hand    Keep your hand elevated above heart level as much as possible for the first several days after surgery. This helps reduce swelling and pain.  To help prevent infection and speed healing, take care not to get your cast or bandages wet.  Keep dressing clean, dry, & intact until your follow up appointment.   Relieve pain as directed  Your surgeon may prescribe pain medicine or suggest you take an anti-inflammatory medicine. You might also be instructed to apply ice (or another cold source) to your hand. If you use ice cubes, put them in a plastic bag and rest it on top of your bandages. Leave the cold source on your hand for as long as its comfortable. Do this several times a day for the first few days after surgery. It may take several minutes before you can feel the cold through the cast or bandages.  Follow up with your surgeon  During a follow-up visit after surgery, your surgeon will check your progress. The stitches, bandages, splint, or cast may be removed. A new cast or  splint may be placed. If your hand has healed enough, your surgeon may prescribe exercises.  Do prescribed hand exercises  Your surgeon may recommend that you do exercises. These may be done under the guidance of a physical or occupational therapist. The exercises strengthen your hand, help you regain flexibility, and restore proper function. Do the exercises as advised.  Call your surgeon if you have...  A fever higher than 100.4°F (38.0°C) taken by mouth  Side effects from your medicine, such as prolonged nausea  A wet or loose dressing, or a dressing that is too tight  Excessive bleeding  Increased, ongoing pain or numbness  Signs of infection (such as drainage, warmth, or redness) at the incision site   Date Last Reviewed: 11/11/2015  © 5578-4123 [employer]. [address] [place] [address], [place] [address]. All rights reserved. This information is not intended as a substitute for professional medical care. Always follow your healthcare professional's instructions.

## 2024-04-18 ENCOUNTER — PATIENT MESSAGE (OUTPATIENT)
Dept: PREADMISSION TESTING | Facility: HOSPITAL | Age: 74
End: 2024-04-18
Payer: COMMERCIAL

## 2024-04-19 ENCOUNTER — HOSPITAL ENCOUNTER (OUTPATIENT)
Facility: HOSPITAL | Age: 74
Discharge: HOME | End: 2024-04-19
Attending: STUDENT IN AN ORGANIZED HEALTH CARE EDUCATION/TRAINING PROGRAM | Admitting: STUDENT IN AN ORGANIZED HEALTH CARE EDUCATION/TRAINING PROGRAM
Payer: MEDICARE

## 2024-04-19 VITALS
BODY MASS INDEX: 39.28 KG/M2 | DIASTOLIC BLOOD PRESSURE: 70 MMHG | OXYGEN SATURATION: 95 % | TEMPERATURE: 98 F | RESPIRATION RATE: 19 BRPM | WEIGHT: 250.25 LBS | SYSTOLIC BLOOD PRESSURE: 150 MMHG | HEART RATE: 82 BPM | HEIGHT: 67 IN

## 2024-04-19 DIAGNOSIS — Z01.818 PREOP TESTING: Primary | ICD-10-CM

## 2024-04-19 DIAGNOSIS — G56.01 CARPAL TUNNEL SYNDROME OF RIGHT WRIST: ICD-10-CM

## 2024-04-19 PROCEDURE — 64721 CARPAL TUNNEL SURGERY: CPT | Mod: RT,,, | Performed by: STUDENT IN AN ORGANIZED HEALTH CARE EDUCATION/TRAINING PROGRAM

## 2024-04-19 PROCEDURE — 63600175 PHARM REV CODE 636 W HCPCS: Performed by: STUDENT IN AN ORGANIZED HEALTH CARE EDUCATION/TRAINING PROGRAM

## 2024-04-19 PROCEDURE — 25000003 PHARM REV CODE 250: Performed by: STUDENT IN AN ORGANIZED HEALTH CARE EDUCATION/TRAINING PROGRAM

## 2024-04-19 PROCEDURE — 71000015 HC POSTOP RECOV 1ST HR: Performed by: STUDENT IN AN ORGANIZED HEALTH CARE EDUCATION/TRAINING PROGRAM

## 2024-04-19 PROCEDURE — 36000707: Performed by: STUDENT IN AN ORGANIZED HEALTH CARE EDUCATION/TRAINING PROGRAM

## 2024-04-19 PROCEDURE — 36000706: Performed by: STUDENT IN AN ORGANIZED HEALTH CARE EDUCATION/TRAINING PROGRAM

## 2024-04-19 RX ORDER — TRAMADOL HYDROCHLORIDE 50 MG/1
50 TABLET ORAL EVERY 8 HOURS PRN
Qty: 5 TABLET | Refills: 0 | Status: SHIPPED | OUTPATIENT
Start: 2024-04-19

## 2024-04-19 RX ORDER — LIDOCAINE HYDROCHLORIDE 10 MG/ML
1 INJECTION, SOLUTION EPIDURAL; INFILTRATION; INTRACAUDAL; PERINEURAL ONCE
Status: DISCONTINUED | OUTPATIENT
Start: 2024-04-19 | End: 2024-04-19 | Stop reason: HOSPADM

## 2024-04-19 RX ORDER — ACETAMINOPHEN 500 MG
500 TABLET ORAL EVERY 8 HOURS PRN
Qty: 30 TABLET | Refills: 0 | Status: SHIPPED | OUTPATIENT
Start: 2024-04-19

## 2024-04-19 RX ORDER — BACITRACIN ZINC 500 UNIT/G
OINTMENT (GRAM) TOPICAL
Status: DISCONTINUED | OUTPATIENT
Start: 2024-04-19 | End: 2024-04-19 | Stop reason: HOSPADM

## 2024-04-19 RX ORDER — BUPIVACAINE HYDROCHLORIDE 2.5 MG/ML
INJECTION, SOLUTION EPIDURAL; INFILTRATION; INTRACAUDAL
Status: DISCONTINUED | OUTPATIENT
Start: 2024-04-19 | End: 2024-04-19 | Stop reason: HOSPADM

## 2024-04-19 RX ORDER — NAPROXEN 500 MG/1
500 TABLET ORAL EVERY 8 HOURS PRN
Qty: 30 TABLET | Refills: 0 | Status: SHIPPED | OUTPATIENT
Start: 2024-04-19

## 2024-04-19 RX ORDER — BACITRACIN ZINC 500 [USP'U]/G
OINTMENT TOPICAL
Status: DISCONTINUED
Start: 2024-04-19 | End: 2024-04-19 | Stop reason: HOSPADM

## 2024-04-19 RX ORDER — LIDOCAINE HYDROCHLORIDE 10 MG/ML
INJECTION, SOLUTION EPIDURAL; INFILTRATION; INTRACAUDAL; PERINEURAL
Status: DISCONTINUED
Start: 2024-04-19 | End: 2024-04-19 | Stop reason: HOSPADM

## 2024-04-19 RX ORDER — BUPIVACAINE HYDROCHLORIDE 2.5 MG/ML
INJECTION, SOLUTION EPIDURAL; INFILTRATION; INTRACAUDAL
Status: DISCONTINUED
Start: 2024-04-19 | End: 2024-04-19 | Stop reason: HOSPADM

## 2024-04-19 RX ORDER — SODIUM CHLORIDE 9 MG/ML
INJECTION, SOLUTION INTRAVENOUS CONTINUOUS
Status: DISCONTINUED | OUTPATIENT
Start: 2024-04-19 | End: 2024-04-19 | Stop reason: HOSPADM

## 2024-04-19 RX ORDER — LIDOCAINE HYDROCHLORIDE 10 MG/ML
INJECTION, SOLUTION EPIDURAL; INFILTRATION; INTRACAUDAL; PERINEURAL
Status: DISCONTINUED | OUTPATIENT
Start: 2024-04-19 | End: 2024-04-19 | Stop reason: HOSPADM

## 2024-04-19 RX ADMIN — CEFAZOLIN 2 G: 2 INJECTION, POWDER, FOR SOLUTION INTRAMUSCULAR; INTRAVENOUS at 01:04

## 2024-04-19 NOTE — PLAN OF CARE
Patient prepped for surgery. Family at bedside.    The lady from [facility] called and ask if we could put in a STAT order for lab test on Oct 5,21 that need to be done to get the results that day. They wanted BUN and Creat. Please advise

## 2024-04-19 NOTE — INTERVAL H&P NOTE
The patient has been examined and the H&P has been reviewed:    I concur with the findings and no changes have occurred since H&P was written.    Surgery risks, benefits and alternative options discussed and understood by patient/family.    Lela Garces MD  Orthopaedic Hand Surgery

## 2024-04-19 NOTE — DISCHARGE SUMMARY
[facility]  Discharge Note  Short Stay    Procedure(s) (LRB):  RELEASE, CARPAL TUNNEL (Right)      OUTCOME: Patient tolerated treatment/procedure well without complication and is now ready for discharge.    DISPOSITION: Home or Self Care    FINAL DIAGNOSIS:  Right carpal tunnel syndrome    FOLLOWUP: In clinic    DISCHARGE INSTRUCTIONS:    Discharge Procedure Orders   CBC Without Differential   Standing Status: Future Standing Exp. Date: 06/23/25     COMPREHENSIVE METABOLIC PANEL   Standing Status: Future Standing Exp. Date: 06/23/25     Diet Adult Regular     Leave dressing on - Keep it clean, dry, and intact until clinic visit     Weight bearing restrictions (specify):   Order Comments: No lifting > 2 lb to the operative extremity. Work on gentle finger range of motion.        TIME SPENT ON DISCHARGE: 5 minutes

## 2024-04-19 NOTE — OP NOTE
[facility]  Orthopaedic Hand Surgery  Operative Note    SUMMARY     Date of Procedure: 4/19/2024     Procedure:   [id number] Right carpal tunnel release       Surgeons and Role:     * Lela Garces MD - Primary    Assistant: None    Pre-Operative Diagnosis: Carpal tunnel syndrome of right wrist [G56.01]    Post-Operative Diagnosis: Post-Op Diagnosis Codes:     * Carpal tunnel syndrome of right wrist [G56.01]    Anesthesia: Local    Indication for Procedure:  73-year-old right-hand dominant female presented to clinic with findings of right carpal tunnel syndrome.  Symptoms had taken place for several weeks.  Patient primarily experienced pain and numbness which would regularly wake her from sleep at night.  No history of diabetes.  Risks and benefits of operative versus nonoperative treatment were discussed with the patient.  Patient elected to forego injections and instead proceed with right carpal tunnel release surgery.  I discussed MAC/local versus local only anesthetic.  Patient elected to proceed with local only.  Consent was obtained for right carpal tunnel release.    Operative Findings (including complications, if any): thickened transverse carpal ligament, median nerve intact    Description of Technical Procedures:   The patient was brought to the operating room and laid supine.  A tourniquet was placed at the right upper arm and the arm prepped with alcohol/chloraprep and draped in the usual sterile surgical fashion.  Preoperative time out was performed with confirmation of correct patient, side, and site, identification of all personnel, confirmation of administration of preoperative antibiotic, dry prep, and confirmation of all needed equipment.  When this was completed, I proceeded with the surgery.    10cc of lidocaine 1% without epinephrine mixed with 0.25% marcaine was injected as local anesthetic. An esmarch was used to exsanguinate the limb and the tourniquet was raised to 250 mm  Hg. A skin marker was used to jazmine the relevant landmarks. A 15- blade was used to make the incision.  The incision began just distal to the distal wrist crease in line with the radial border of the ring finger. The incision was made through the skin, subcutaneous tissue, palmar fascia down to the transverse carpal ligament. Under direct visualization, the transverse carpal ligament was identified and incised just radial to the hook of the hamate. I sharply incised the entirety of the transverse carpal ligament distally under direct visualization just past the fat surrounding the superficial palmar arch. I then incised the proximal portion of the transverse carpal ligament to and well across the wrist crease to include the distal forearm fascia.  The ligament was sharply divided with care given to avoiding injury to adjacent neurovascular structures.  I were able to confirm a complete release proximally and distally by direct visualization and by palpation- there were no constrictive elements affecting the nerve.  The wound was copiously irrigated. The tourniquet was let down. Total tourniquet time was 6 minutes. Hemostasis was obtained. Incision was closed with 4-0 nylon. Bacitracin, adaptic, 4x4s, webril, and coban were applied. All sponge, needle, and instrument counts were correct at the conclusion of the procedure.  The patient was awakened and taken to the recovery room in stable condition.    Estimated Blood Loss (EBL): 1cc           Implants: * No implants in log *    Specimens:   Specimen (24h ago, onward)      None                    Condition: Good    Disposition: PACU - hemodynamically stable.    Attestation: I was present and scrubbed for the entire procedure.    Lela Garces MD  Orthopaedic Hand Surgery

## 2024-04-29 ENCOUNTER — TELEPHONE (OUTPATIENT)
Dept: SPORTS MEDICINE | Facility: CLINIC | Age: 74
End: 2024-04-29
Payer: COMMERCIAL

## 2024-04-29 NOTE — TELEPHONE ENCOUNTER
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 No ans, LVM to return call

## 2024-04-29 NOTE — TELEPHONE ENCOUNTER
----- Message from Robert Alejo sent at 4/29/2024  4:52 PM CDT -----  Contact: Patient  Type:  Patient Returning Call    Who Called:Jeana Monsivais   Who Left Message for Patient: nurse   Does the patient know what this is regarding?: her post op appt  Would the patient rather a call back or a response via [facility]?  Call   Best Call Back Number: [phone]   Additional Information:  pt states she is only available on Monday of next week.

## 2024-05-02 ENCOUNTER — OFFICE VISIT (OUTPATIENT)
Dept: ORTHOPEDICS | Facility: CLINIC | Age: 74
End: 2024-05-02
Payer: MEDICARE

## 2024-05-02 VITALS — WEIGHT: 250.25 LBS | BODY MASS INDEX: 39.28 KG/M2 | HEIGHT: 67 IN

## 2024-05-02 DIAGNOSIS — M75.32 CALCIFIC TENDINITIS OF LEFT SHOULDER: ICD-10-CM

## 2024-05-02 DIAGNOSIS — M17.11 PRIMARY OSTEOARTHRITIS OF RIGHT KNEE: Primary | ICD-10-CM

## 2024-05-02 PROCEDURE — 99499 UNLISTED E&M SERVICE: CPT | Mod: S$PBB,,, | Performed by: STUDENT IN AN ORGANIZED HEALTH CARE EDUCATION/TRAINING PROGRAM

## 2024-05-02 PROCEDURE — 99213 OFFICE O/P EST LOW 20 MIN: CPT | Mod: PBBFAC,PO | Performed by: STUDENT IN AN ORGANIZED HEALTH CARE EDUCATION/TRAINING PROGRAM

## 2024-05-02 PROCEDURE — 99999 PR PBB SHADOW E&M-EST. PATIENT-LVL III: CPT | Mod: PBBFAC,,, | Performed by: STUDENT IN AN ORGANIZED HEALTH CARE EDUCATION/TRAINING PROGRAM

## 2024-05-02 PROCEDURE — 20610 DRAIN/INJ JOINT/BURSA W/O US: CPT | Mod: PBBFAC,PO,RT | Performed by: STUDENT IN AN ORGANIZED HEALTH CARE EDUCATION/TRAINING PROGRAM

## 2024-05-02 PROCEDURE — 99999PBSHW PR PBB SHADOW TECHNICAL ONLY FILED TO HB: Mod: JZ,PBBFAC,,

## 2024-05-02 RX ADMIN — Medication 16 MG: at 10:05

## 2024-05-02 NOTE — PROCEDURES
Large Joint Aspiration/Injection: R knee    Date/Time: 5/2/2024 10:20 AM    Performed by: Pilo Rangel MD  Authorized by: Pilo Rangel MD    Consent Done?:  Yes (Verbal)  Indications:  Pain  Site marked: the procedure site was marked    Timeout: prior to procedure the correct patient, procedure, and site was verified    Prep: patient was prepped and draped in usual sterile fashion      Local anesthesia used?: Yes    Local anesthetic:  Topical anesthetic    Details:  Needle Size:  22 G  Ultrasonic Guidance for needle placement?: No    Approach: Superior lateral.  Location:  Knee  Site:  R knee  Medications:  16 mg hyaluronate 16 mg/2 mL  Patient tolerance:  Patient tolerated the procedure well with no immediate complications     Ultrasound guidance was used for needle localization. Images were saved and stored for documentation. The appropriate structures were visualized. Dynamic visualization of the needle was continuous throughout the procedures and maintained good position.     We discussed the proper protocols after the injection such as no submerging pools, baths tubs, or hot tubs for 24 hr.  Showering is okay today.  We discussed red flags such as fevers, chills, red, warm, tender joint at the area of injection to please seek medical care immediately.      MEDICAL NECESSITY FOR VISCOSUPPLEMETNATION: After thorough evaluation of the patient, I have determined that visco-supplementation is medically necessary. The patient has painful degenerative changes of the knee with failure of conservative treatments including lifestyle modifications and rehabilitation exercises.  Oral analgesis/NSAIDs have not adequately controlled symptoms and there is radiographic evidence of Kellgren Son grade 2 or greater osteoarthritic changes, or in lack of radiographic evidence, there is arthroscopic or other evidence of chondrosis.     Synvisc: right knee 1/3

## 2024-05-02 NOTE — PATIENT INSTRUCTIONS
Assessment:  Jeana Monsivais is a 73 y.o. female   Chief Complaint   Patient presents with    Left Shoulder - Pain, Follow-up    Right Knee - Pain, Injections       Encounter Diagnoses   Name Primary?    Primary osteoarthritis of right knee Yes    Calcific tendinitis of left shoulder         Plan:  Provided viscosupplementation injection today. We discussed the proper protocols after the injection such as no submerging pools, baths tubs, or hot tubs for 24 hr.  Showering is okay today.   We discussed red flags such as fevers, chills, red, warm, tender joint at the area of injection to please seek medical care immediately.      This is a series of  three injections over three weeks. In this procedure, a gel-like fluid called hyaluronic acid is injected into the knee joint. Hyaluronic acid is a naturally occurring substance found in the synovial fluid surrounding joints. People with osteoarthritis have a lower-than-normal concentration of hyaluronic acid in their joints The theory is that adding hyaluronic acid to the arthritic joint will facilitate movement and reduce pain through a cellular level of improvement. The receptors within the knee are then down regulated to reduce the sensitivity and there is a greater stimulation production of hyaluronic acid.   Recommend TENJET to left shoulder    Here is some general information on TenJet procedure. You may also watch the video on this link to see an overview of how the procedure goes as well.   TenJet Video                          Follow-up: As needed or sooner if there are any problems between now and then.    Thank you for choosing [facility] and Dr. Pilo Rangel for your orthopedic & sports medicine care. It is our goal to provide you with exceptional care that will help keep you healthy, active, and get you back in the game.    Please do not hesitate to reach out to us via email, phone, or MyChart with any questions, concerns, or  feedback.    If you felt that you received exemplary care today, please consider leaving us feedback on Healthgrades at:  [email or web address]    If you are experiencing pain/discomfort ,or have questions after 5pm and would like to be connected to the [facility] on-call team, please call this number and specify which Sports Medicine provider is treating you: [phone]

## 2024-05-06 ENCOUNTER — OFFICE VISIT (OUTPATIENT)
Dept: ORTHOPEDICS | Facility: CLINIC | Age: 74
End: 2024-05-06
Payer: MEDICARE

## 2024-05-06 VITALS — BODY MASS INDEX: 39.28 KG/M2 | WEIGHT: 250.25 LBS | HEIGHT: 67 IN

## 2024-05-06 DIAGNOSIS — Z98.890 POSTOPERATIVE STATE: Primary | ICD-10-CM

## 2024-05-06 PROCEDURE — 99213 OFFICE O/P EST LOW 20 MIN: CPT | Mod: PBBFAC | Performed by: STUDENT IN AN ORGANIZED HEALTH CARE EDUCATION/TRAINING PROGRAM

## 2024-05-06 PROCEDURE — 99999 PR PBB SHADOW E&M-EST. PATIENT-LVL III: CPT | Mod: PBBFAC,,, | Performed by: STUDENT IN AN ORGANIZED HEALTH CARE EDUCATION/TRAINING PROGRAM

## 2024-05-06 PROCEDURE — 99024 POSTOP FOLLOW-UP VISIT: CPT | Mod: POP,,, | Performed by: STUDENT IN AN ORGANIZED HEALTH CARE EDUCATION/TRAINING PROGRAM

## 2024-05-07 NOTE — PROGRESS NOTES
Hand Surgery Clinic Postop Note    Surgery Date: 4/19/24  Surgery: Right carpal tunnel release    Postoperative Course:  Patient is doing well.  Pain level is a 1/10.  She is not requiring any medications for pain at this point.  She is no longer experiencing the pain and numbness which previously woke her from sleep on a daily basis.  She is no longer experiencing numbness in the middle finger intermittently during the daytime.  No fevers or chills.  She was kept her dressing clean and dry.  No other issues.    Vital Signs  There were no vitals filed for this visit.    Physical Exam  General - NAD  Resp - nonlabored breathing  CV - RR by RP    Right Upper Extremity:  Dressing was removed for exam, it is clean and dry.  Incision is nicely healed.  Nylon suture in place.  No swelling.  No erythema.  No drainage.  Patient is able to make a fist and extend all her fingers.  Full active wrist flexion and extension.  Two-point discrimination is 5 mm in all 5 fingers.  Palpable radial pulse.    Imaging  No new imaging obtained today.    Assessment and Plan  73-year-old female is 2.5 weeks status post right carpal tunnel release.  She was doing great.  She was no longer experiencing the nerve symptoms she had prior to surgery.  Sutures were removed in clinic today.  Okay to shower.  I provided patient some gauze and Tegaderm to place over the incision if she decides to get in the pull in the next week or 2.  Activity as tolerated.  No restrictions.  Discussed home exercises to work on range of motion in the wrist and fingers.  Discussed scar massage.  Follow up in clinic in 4 weeks for re-evaluation.    Lela Garces MD  Orthopaedic Hand Surgery

## 2024-05-09 ENCOUNTER — OFFICE VISIT (OUTPATIENT)
Dept: ORTHOPEDICS | Facility: CLINIC | Age: 74
End: 2024-05-09
Payer: MEDICARE

## 2024-05-09 DIAGNOSIS — M17.11 PRIMARY OSTEOARTHRITIS OF RIGHT KNEE: Primary | ICD-10-CM

## 2024-05-09 PROCEDURE — 99999PBSHW PR PBB SHADOW TECHNICAL ONLY FILED TO HB: Mod: JZ,PBBFAC,,

## 2024-05-09 PROCEDURE — 99999 PR PBB SHADOW E&M-EST. PATIENT-LVL I: CPT | Mod: PBBFAC,,, | Performed by: STUDENT IN AN ORGANIZED HEALTH CARE EDUCATION/TRAINING PROGRAM

## 2024-05-09 PROCEDURE — 99499 UNLISTED E&M SERVICE: CPT | Mod: S$PBB,,, | Performed by: STUDENT IN AN ORGANIZED HEALTH CARE EDUCATION/TRAINING PROGRAM

## 2024-05-09 PROCEDURE — 99211 OFF/OP EST MAY X REQ PHY/QHP: CPT | Mod: PBBFAC,PO | Performed by: STUDENT IN AN ORGANIZED HEALTH CARE EDUCATION/TRAINING PROGRAM

## 2024-05-09 PROCEDURE — 20610 DRAIN/INJ JOINT/BURSA W/O US: CPT | Mod: PBBFAC,PO | Performed by: STUDENT IN AN ORGANIZED HEALTH CARE EDUCATION/TRAINING PROGRAM

## 2024-05-09 RX ADMIN — Medication 16 MG: at 10:05

## 2024-05-09 NOTE — PROCEDURES
Large Joint Aspiration/Injection: R knee    Date/Time: 5/9/2024 10:20 AM    Performed by: Pilo Rangel MD  Authorized by: Pilo Rangel MD    Consent Done?:  Yes (Verbal)  Indications:  Pain  Site marked: the procedure site was marked    Timeout: prior to procedure the correct patient, procedure, and site was verified      Local anesthesia used?: Yes    Local anesthetic:  Topical anesthetic    Details:  Needle Size:  22 G  Ultrasonic Guidance for needle placement?: No    Approach: Superior lateral.  Location:  Knee  Site:  R knee  Medications:  16 mg hyaluronate 16 mg/2 mL  Patient tolerance:  Patient tolerated the procedure well with no immediate complications     Ultrasound guidance was used for needle localization. Images were saved and stored for documentation. The appropriate structures were visualized. Dynamic visualization of the needle was continuous throughout the procedures and maintained good position.     We discussed the proper protocols after the injection such as no submerging pools, baths tubs, or hot tubs for 24 hr.  Showering is okay today.  We discussed red flags such as fevers, chills, red, warm, tender joint at the area of injection to please seek medical care immediately.      MEDICAL NECESSITY FOR VISCOSUPPLEMETNATION: After thorough evaluation of the patient, I have determined that visco-supplementation is medically necessary. The patient has painful degenerative changes of the knee with failure of conservative treatments including lifestyle modifications and rehabilitation exercises.  Oral analgesis/NSAIDs have not adequately controlled symptoms and there is radiographic evidence of Kellgren Son grade 2 or greater osteoarthritic changes, or in lack of radiographic evidence, there is arthroscopic or other evidence of chondrosis.     Euflexxa: Right knee 2/3

## 2024-05-09 NOTE — PATIENT INSTRUCTIONS
Assessment:  Jeana Monsivais is a 73 y.o. female No chief complaint on file.      No diagnosis found.     Plan:  Provided viscosupplementation injection today. We discussed the proper protocols after the injection such as no submerging pools, baths tubs, or hot tubs for 24 hr.  Showering is okay today.   We discussed red flags such as fevers, chills, red, warm, tender joint at the area of injection to please seek medical care immediately.      This is a series of  three injections over three weeks. In this procedure, a gel-like fluid called hyaluronic acid is injected into the knee joint. Hyaluronic acid is a naturally occurring substance found in the synovial fluid surrounding joints. People with osteoarthritis have a lower-than-normal concentration of hyaluronic acid in their joints The theory is that adding hyaluronic acid to the arthritic joint will facilitate movement and reduce pain through a cellular level of improvement. The receptors within the knee are then down regulated to reduce the sensitivity and there is a greater stimulation production of hyaluronic acid.        Follow-up: As needed or sooner if there are any problems between now and then.    Thank you for choosing [facility] and Dr. Pilo Rangel for your orthopedic & sports medicine care. It is our goal to provide you with exceptional care that will help keep you healthy, active, and get you back in the game.    Please do not hesitate to reach out to us via email, phone, or MyChart with any questions, concerns, or feedback.    If you felt that you received exemplary care today, please consider leaving us feedback on Healthgrades at:  [email or web address]    If you are experiencing pain/discomfort ,or have questions after 5pm and would like to be connected to the [facility] on-call team, please call this number and specify which Sports Medicine provider  is treating you: [phone]

## 2024-05-16 ENCOUNTER — OFFICE VISIT (OUTPATIENT)
Dept: ORTHOPEDICS | Facility: CLINIC | Age: 74
End: 2024-05-16
Payer: MEDICARE

## 2024-05-16 DIAGNOSIS — M17.11 PRIMARY OSTEOARTHRITIS OF RIGHT KNEE: Primary | ICD-10-CM

## 2024-05-16 PROCEDURE — 20611 DRAIN/INJ JOINT/BURSA W/US: CPT | Mod: PBBFAC,PO | Performed by: STUDENT IN AN ORGANIZED HEALTH CARE EDUCATION/TRAINING PROGRAM

## 2024-05-16 PROCEDURE — 99499 UNLISTED E&M SERVICE: CPT | Mod: S$PBB,,, | Performed by: STUDENT IN AN ORGANIZED HEALTH CARE EDUCATION/TRAINING PROGRAM

## 2024-05-16 PROCEDURE — 99999PBSHW PR PBB SHADOW TECHNICAL ONLY FILED TO HB: Mod: JZ,PBBFAC,,

## 2024-05-16 RX ADMIN — Medication 16 MG: at 11:05

## 2024-05-16 NOTE — PROCEDURES
Large Joint Aspiration/Injection: R knee    Date/Time: 5/16/2024 11:40 AM    Performed by: Pilo Rangel MD  Authorized by: Pilo Rangel MD    Consent Done?:  Yes (Verbal)  Indications:  Pain  Site marked: the procedure site was marked    Timeout: prior to procedure the correct patient, procedure, and site was verified      Local anesthesia used?: Yes    Local anesthetic:  Topical anesthetic    Details:  Needle Size:  22 G  Ultrasonic Guidance for needle placement?: Yes    Images are saved and documented.  Approach: Superior lateral.  Location:  Knee  Site:  R knee  Medications:  16 mg hyaluronate 16 mg/2 mL  Patient tolerance:  Patient tolerated the procedure well with no immediate complications     Ultrasound guidance was used for needle localization. Images were saved and stored for documentation. The appropriate structures were visualized. Dynamic visualization of the needle was continuous throughout the procedures and maintained good position.     We discussed the proper protocols after the injection such as no submerging pools, baths tubs, or hot tubs for 24 hr.  Showering is okay today.  We discussed red flags such as fevers, chills, red, warm, tender joint at the area of injection to please seek medical care immediately.      MEDICAL NECESSITY FOR VISCOSUPPLEMETNATION: After thorough evaluation of the patient, I have determined that visco-supplementation is medically necessary. The patient has painful degenerative changes of the knee with failure of conservative treatments including lifestyle modifications and rehabilitation exercises.  Oral analgesis/NSAIDs have not adequately controlled symptoms and there is radiographic evidence of Kellgren Son grade 2 or greater osteoarthritic changes, or in lack of radiographic evidence, there is arthroscopic or other evidence of chondrosis.     : Right knee 3/3

## 2024-06-04 ENCOUNTER — PATIENT MESSAGE (OUTPATIENT)
Dept: GASTROENTEROLOGY | Facility: CLINIC | Age: 74
End: 2024-06-04
Payer: COMMERCIAL

## 2024-06-05 ENCOUNTER — OFFICE VISIT (OUTPATIENT)
Dept: SPORTS MEDICINE | Facility: CLINIC | Age: 74
End: 2024-06-05
Payer: MEDICARE

## 2024-06-05 ENCOUNTER — OFFICE VISIT (OUTPATIENT)
Dept: ORTHOPEDICS | Facility: CLINIC | Age: 74
End: 2024-06-05
Payer: MEDICARE

## 2024-06-05 VITALS — WEIGHT: 250.25 LBS | HEIGHT: 67 IN | BODY MASS INDEX: 39.28 KG/M2

## 2024-06-05 DIAGNOSIS — M25.512 LEFT SHOULDER PAIN, UNSPECIFIED CHRONICITY: ICD-10-CM

## 2024-06-05 DIAGNOSIS — Z98.890 POSTOPERATIVE STATE: Primary | ICD-10-CM

## 2024-06-05 DIAGNOSIS — M75.32 CALCIFIC TENDONITIS OF LEFT SHOULDER: Primary | ICD-10-CM

## 2024-06-05 PROCEDURE — 99213 OFFICE O/P EST LOW 20 MIN: CPT | Mod: PBBFAC,27 | Performed by: STUDENT IN AN ORGANIZED HEALTH CARE EDUCATION/TRAINING PROGRAM

## 2024-06-05 PROCEDURE — 99024 POSTOP FOLLOW-UP VISIT: CPT | Mod: POP,,, | Performed by: STUDENT IN AN ORGANIZED HEALTH CARE EDUCATION/TRAINING PROGRAM

## 2024-06-05 PROCEDURE — 99499 UNLISTED E&M SERVICE: CPT | Mod: S$PBB,,, | Performed by: STUDENT IN AN ORGANIZED HEALTH CARE EDUCATION/TRAINING PROGRAM

## 2024-06-05 PROCEDURE — 99999 PR PBB SHADOW E&M-EST. PATIENT-LVL III: CPT | Mod: PBBFAC,,, | Performed by: STUDENT IN AN ORGANIZED HEALTH CARE EDUCATION/TRAINING PROGRAM

## 2024-06-05 PROCEDURE — 99213 OFFICE O/P EST LOW 20 MIN: CPT | Mod: PBBFAC | Performed by: STUDENT IN AN ORGANIZED HEALTH CARE EDUCATION/TRAINING PROGRAM

## 2024-06-05 NOTE — PATIENT INSTRUCTIONS
Assessment:  Jeana Monsivais is a 73 y.o. female   Chief Complaint   Patient presents with    Left Shoulder - Pain       Encounter Diagnoses   Name Primary?    Left shoulder pain, unspecified chronicity     Calcific tendonitis of left shoulder Yes        Plan:  Utilized a limited musculoskeletal examination of left shoulder and discussed pertinent findings.  Move forward with TENJET left shoulder     Here is some general information on TenJet procedure. You may also watch the video on this link to see an overview of how the procedure goes as well.   TenJet Video                      Follow-up: For TENJET or sooner if there are any problems between now and then.    Thank you for choosing [facility] and Dr. Pilo Rangel for your orthopedic & sports medicine care. It is our goal to provide you with exceptional care that will help keep you healthy, active, and get you back in the game.    Please do not hesitate to reach out to us via email, phone, or MyChart with any questions, concerns, or feedback.    If you felt that you received exemplary care today, please consider leaving us feedback on Healthgrades at:  [email or web address]    If you are experiencing pain/discomfort ,or have questions after 5pm and would like to be connected to the [facility] on-call team, please call this number and specify which Sports Medicine provider is treating you: [phone]

## 2024-06-05 NOTE — PROGRESS NOTES
Patient ID: Jeana Monsivais  YOB: 1950  MRN: [id number]      Imaging:    FOCUSED:  1. Diagnostic Extremity - [facility]Sports Ultrasound was recommended due to left shoulder.  2. Diagnostic Extremity - [facility] Ultrasound Performed: Pilo Rangel Extremity Study: left shoulder.    TECHNIQUE: Real time ultrasound examination of the left rotator cuff was performed with SonoSite Edge 2, 9-L MHz linear probe(s).     FINDINGS: The images are of adequate diagnostic quality with identification of all echogenic structures made except for the vascular structures unless otherwise noted. There is no sonographic evidence of periosteal abnormalities, soft tissue edema, musculotendinous or nerve irregularities.  Left shoulder infraspinatus tendon with large heterogeneous calcific density throughout the almost entirety of the visualized infraspinatus tendon. The rest of the sonographic examination was unremarkable.  Ultrasound images were directly reviewed with the patient and then a treatment plan was discussed.  The images were saved and stored for documentation in the EMR.     IMPRESSION:  large calcific tendinitis of the infraspinatus tendon     Calcific tendonitis of left shoulder  -     Tenotomy shoulder area; Future    Left shoulder pain, unspecified chronicity  -     Sports Medicine US - Extremity Non-Vascular LIMITED Left         A copy of today's visit note has been sent to the referring provider.       Pilo Rangel MD    Disclaimer: This note was prepared using a voice recognition system and is likely to have sound alike errors within the text.   
independent

## 2024-06-05 NOTE — PROGRESS NOTES
Hand Surgery Clinic Postop Note    Surgery Date: 4/19/24  Surgery: Right carpal tunnel release    Postoperative Course:  73-year-old female presents for follow up.  She was 6 weeks and 5 days status post right carpal tunnel release.  She has been doing great.  Pain level is a 1/10.  She has no night pain.  No numbness or tingling.  No fevers or chills.  She notes some slight rigidity of the scar which she has been massaging.  She was returned to all functional activities without any issues.    Vital Signs  There were no vitals filed for this visit.    Physical Exam  General - NAD  Resp - nonlabored breathing  CV - RR by RP    Right Upper Extremity:  Incision is well healed, slightly rigid.  No swelling.  No erythema.  No drainage. Patient is able to make a fist and extend all her fingers.  Full active wrist flexion and extension.  Two-point discrimination is 5 mm in all 5 fingers.  Palpable radial pulse.     Imaging  No new imaging obtained today.    Assessment and Plan  73-year-old female is 6 weeks and 5 days status post right carpal tunnel release.  She is doing great.  She is no longer experiencing carpal tunnel nerve symptoms.  She has returned to full activities without any issues.  She has been massaging her scar and the slight rigidity which is present has been gradually improving.  Patient has no restrictions at this point.  Follow up in clinic on an as-needed basis.    Lela Garces MD  Orthopaedic Hand Surgery

## 2024-06-19 ENCOUNTER — PROCEDURE VISIT (OUTPATIENT)
Dept: SPORTS MEDICINE | Facility: CLINIC | Age: 74
End: 2024-06-19
Payer: MEDICARE

## 2024-06-19 ENCOUNTER — PATIENT MESSAGE (OUTPATIENT)
Dept: SPORTS MEDICINE | Facility: CLINIC | Age: 74
End: 2024-06-19

## 2024-06-19 VITALS — BODY MASS INDEX: 39.28 KG/M2 | HEIGHT: 67 IN | WEIGHT: 250.25 LBS

## 2024-06-19 DIAGNOSIS — M75.32 CALCIFIC TENDONITIS OF LEFT SHOULDER: ICD-10-CM

## 2024-06-19 PROCEDURE — 23406 INCISE TENDON(S) & MUSCLE(S): CPT | Mod: S$PBB,LT,, | Performed by: STUDENT IN AN ORGANIZED HEALTH CARE EDUCATION/TRAINING PROGRAM

## 2024-06-19 PROCEDURE — 97110 THERAPEUTIC EXERCISES: CPT | Mod: PBBFAC | Performed by: STUDENT IN AN ORGANIZED HEALTH CARE EDUCATION/TRAINING PROGRAM

## 2024-06-19 PROCEDURE — 99499 UNLISTED E&M SERVICE: CPT | Mod: S$PBB,,, | Performed by: STUDENT IN AN ORGANIZED HEALTH CARE EDUCATION/TRAINING PROGRAM

## 2024-06-19 PROCEDURE — 97760 ORTHOTIC MGMT&TRAING 1ST ENC: CPT | Mod: PBBFAC | Performed by: STUDENT IN AN ORGANIZED HEALTH CARE EDUCATION/TRAINING PROGRAM

## 2024-06-19 PROCEDURE — 76942 ECHO GUIDE FOR BIOPSY: CPT | Mod: 26,S$PBB,, | Performed by: STUDENT IN AN ORGANIZED HEALTH CARE EDUCATION/TRAINING PROGRAM

## 2024-06-19 PROCEDURE — 23406 INCISE TENDON(S) & MUSCLE(S): CPT | Mod: PBBFAC,LT | Performed by: STUDENT IN AN ORGANIZED HEALTH CARE EDUCATION/TRAINING PROGRAM

## 2024-06-19 PROCEDURE — 76942 ECHO GUIDE FOR BIOPSY: CPT | Mod: PBBFAC | Performed by: STUDENT IN AN ORGANIZED HEALTH CARE EDUCATION/TRAINING PROGRAM

## 2024-06-19 RX ORDER — TRAMADOL HYDROCHLORIDE 50 MG/1
50 TABLET ORAL EVERY 6 HOURS PRN
Qty: 14 TABLET | Refills: 0 | Status: SHIPPED | OUTPATIENT
Start: 2024-06-19

## 2024-06-19 NOTE — PROCEDURES
TENJET Operative Note:    PATIENT NAME: Jeana Monsivais    MEDICAL RECORD NUMBER: [id number]    AGE: 73 y.o.    INFORMED CONSENT: I verify that I personally obtained Jeana Monsivais's consent which was signed and uploaded into "[employer]     TIMEOUT: Audible timeout was performed at 9:21 am.   At this time, the team confirmed the correct patient, correct procedure and correct site with laterality. The patient's allergies were reviewed. The procedure site was marked. The procedure team checked for proper functioning of devices and supplies to be used for the procedure. The procedure team reviewed the relevant diagnostic tests pertinent to the procedure.  Confirmed by SMA Tamiko Pierre.    PHYSICIAN: Pilo Rangel    LOCATION: [facility], [facility]      PROCEDURE: TenJet Tenotomy procedure for   Calcific tendinitis infraspinatus supraspinatus left shoulder    ANESTHESIA: local    PREOPERATIVE DIAGNOSIS:  calcific tendinitis infraspinatus supraspinatus left shoulder    POSTOPERATIVE DIAGNOSIS:  calcific tendinitis of the infraspinatus and supraspinatus left shoulder with partial tearing to the bursal side of the infraspinatus    PROCEDURE DESCRIPTION:    The treatment area was cleaned and draped in sterile fashion. Using sterile technique, a [employer] M-Turbo ultrasound laptop unit with a variable frequency (13.0-6.0 MHz) linear transducer was used to successfully localize the area of pathology to be treated today. A jazmine with a sterile marker was placed, on the skin, at the area of maximum tenderness. Then the treatment area was cleaned and draped in sterile fashion. A 22 gauge needle was visualized under ultrasound administering anesthetic lidocaine, to locally anesthetize the treatment area. A separate 25 gauge needle was then utilized to create a skin wheel using 1% lidocaine with epinephrine 1.5cm from the treatment area. An 11 gauge scalpel was used to make a small puncture incision  in the area of the skin wheel, and ultrasound was utilized to visualize the scalpel at the target area. The 3 inch tenotomy needle was inserted into the pathologic tissue under direct ultrasound guidance, and tenotomy was performed with degenerative tendinotic tissue removed. Upon completion, gentle compression was applied to the site with sterile gauze. Adhesive strips, occlusive dressing, and compression bandage were then applied.    Patient left the procedure room in satisfactory condition having tolerated the procedure well.    Volume:  2 L    ESTIMATED BLOOD LOSS: <5 ml    COMPLICATIONS: none    POSTOPERATIVE PLAN:   As indicated in the AVS given to the patient today post procedure.      Patient voiced understanding of these instructions.    Pilo Rangel

## 2024-06-19 NOTE — PATIENT INSTRUCTIONS
Assessment:  Jeana Monsivais is a 73 y.o. female   Chief Complaint   Patient presents with    Left Shoulder - Procedure       Encounter Diagnosis   Name Primary?    Calcific tendonitis of left shoulder         Plan:  Performed percutaneous tenotomy (TENJET) procedure in office today.   Provided proper education regarding in-procedure expectations and post-procedure expectations.   At least 15 minutes were spent sizing, fitting, and educating regarding durable medical equipment today and all questions answered. This service was performed by Tamiko Pierre Sports Medicine Assistant under direction of Pilo Rangel MD today.  CPT 27702.  At least 15 minutes were spent developing, teaching, and performing a home exercise program.  A written summary was provided and all questions were answered. This service was performed by Tamiko Pierre Sports Medicine Assistant under direction of Pilo Rangel MD. CPT 97999-TY  An ambulatory referral to physical therapy was placed within the [facility] system today. You should expect a phone call within the next few days from Centralized Scheduling. If you do not hear lfrom them, please reach out to the PT department directly at [phone].         TENJET POST-PROCEDURE INSTRUCTIONS        1. WOUND CARE   - Keep bandages and procedure area clean and dry for 5 days   - Avoid submerging area in water for 5 days   - You did not have any sutures or stitches placed. Steri-strips were placed over the wound. These will fall off in the shower after about one week. If they have not fallen off after the first 9 days, you can remove them yourself.        CONTACT OUR OFFICE IF:     The area become red or hot to touch     You have a fever of 100° or more (but do not wait for a response, seek immediate care)     You have increased pain or swelling     You have drainage from the treatment site     Best way to connect would be via [employer] or call [facility] at [phone]. If unable to connect, please  proceed to the nearest Emergency Care Center.       2. POST-PROCEDURE PAIN CONTROL   - You may apply heat for 20 minutes as needed for discomfort   - Pain control:  Tylenol (acetaminophen), hot pack application, Tramadol as needed   - Please refrain from using anti-inflammatory medication as this can react negatively with the goal of the procedure.        3. WEEK BY WEEK SCHEDULE     Weeks 1-2     Immobilization: Sling (only for one week)     Lifting restriction: Typically, no more then 2-3 lbs (can of peas) for the first 2-3 weeks     Activity/work limitations: No overuse/repetitive activity     Week 3-5     Therapy: Start at the beginning of week 3     Immobilization: none     Return to work/activity: Per guidance of Physical therapist     Week 6-8     Follow-up in the office with Dr. Rangel     Week 7-12    Progress with your training or return to work     Typically, full results are noted at 3 months and beyond     Week 12     Follow-up with Dr. Rangel             Follow-up: 6 weeks or sooner if there are any problems between now and then.    Thank you for choosing [facility] and Dr. Pilo Rangel for your orthopedic & sports medicine care. It is our goal to provide you with exceptional care that will help keep you healthy, active, and get you back in the game.    Please do not hesitate to reach out to us via email, phone, or MyChart with any questions, concerns, or feedback.    If you felt that you received exemplary care today, please consider leaving us feedback on Health[employer] at:  [email or web address]    If you are experiencing pain/discomfort ,or have questions and would like to be connected to the [facility] on-call team, please call this number and specify which Sports Medicine provider is treating you: [phone]

## 2024-06-21 ENCOUNTER — TELEPHONE (OUTPATIENT)
Dept: SPORTS MEDICINE | Facility: CLINIC | Age: 74
End: 2024-06-21
Payer: COMMERCIAL

## 2024-06-21 NOTE — TELEPHONE ENCOUNTER
Pt doing well s/p JOSTIN. No issues to date. Using heat and ice as needed, No red flags. Starting HEP tomorrow. Scheduled for PT   Normal for race

## 2024-07-02 ENCOUNTER — CLINICAL SUPPORT (OUTPATIENT)
Dept: REHABILITATION | Facility: HOSPITAL | Age: 74
End: 2024-07-02
Attending: STUDENT IN AN ORGANIZED HEALTH CARE EDUCATION/TRAINING PROGRAM
Payer: COMMERCIAL

## 2024-07-02 DIAGNOSIS — R29.898 IMPAIRED STRENGTH OF SHOULDER MUSCLES: ICD-10-CM

## 2024-07-02 DIAGNOSIS — M75.32 CALCIFIC TENDONITIS OF LEFT SHOULDER: Primary | ICD-10-CM

## 2024-07-02 DIAGNOSIS — M25.612 DECREASED RANGE OF MOTION OF LEFT SHOULDER: ICD-10-CM

## 2024-07-02 PROBLEM — M25.611 DECREASED RIGHT SHOULDER RANGE OF MOTION: Status: ACTIVE | Noted: 2024-07-02

## 2024-07-02 PROCEDURE — 97110 THERAPEUTIC EXERCISES: CPT | Mod: PN

## 2024-07-02 PROCEDURE — 97140 MANUAL THERAPY 1/> REGIONS: CPT | Mod: PN

## 2024-07-02 PROCEDURE — 97161 PT EVAL LOW COMPLEX 20 MIN: CPT | Mod: PN

## 2024-07-02 NOTE — PLAN OF CARE
[facility]   Physical Therapy Initial Evaluation     Date: 7/2/2024   Name: Jeana Monsivais  Clinic Number: [id number]    Therapy Diagnosis:   Encounter Diagnoses   Name Primary?    Calcific tendonitis of left shoulder Yes    Impaired strength of shoulder muscles     Decreased range of motion of left shoulder      Physician: Pilo Rangel MD    Physician Orders: PT Eval and Treat   Medical Diagnosis from Referral: Calcific tendonitis of left shoulder [M75.32]   Evaluation Date: 7/2/2024  Authorization Period Expiration: 12/31/2024  Plan of Care Expiration: 8/30/24  Progress Note Due: 8/2/24  Visit # / Visits authorized: 1/1 eval  FOTO: 1/3 (last performed 7/2/2024)     PRECAUTIONS: Standard      MD Follow up: 7/31/24 with Dr. Ranegl    Time In: 2:30 pm   Time Out: 3:30 pm   Total Appointment Time (timed & untimed codes): 60 minutes      SUBJECTIVE     Date of onset: TenJet to L shoulder on 6/19/2024    History of current condition - Jeana reports: TenJet to L shoulder on 6/19/24. States she has been consistent with her exercises and notice steady improvement in left shoulder pain since procedure.    Falls: none    Imaging: [x] Xray [] MRI [] CT: Performed on: 3/7/24    FINDINGS: 5 views. No fractures or dislocations. There appears to be calcific bursitis.     Prior Therapy:   [x] N/A  [] Yes:   Social History: Pt lives alone   Occupation: Patient is retired    Prior Level of Function: Independent and pain free with all activities of daily living  Current Level of Function: Mod I with ADLs    Pain:  Current 4/10, worst 8/10  Location: [] Right   [x] Left:  shoulder  Description: Aching, Dull, and Throbbing  Aggravating Factors: overhead movement   Easing Factors: activity avoidance, rest, pain medication    Patients goals: reduce pain      Medical History:   No past medical history on file.    Surgical History:   Jeana Monsivais  has a past surgical history that includes Hysterectomy  (1997); Breast lumpectomy (Left, 2019); kyphoplasty; and Carpal tunnel release (Right, 4/19/2024).    Medications:   Jeana has a current medication list which includes the following prescription(s): acetaminophen, anastrozole, cetirizine, cyanocobalamin, cyclobenzaprine, diclofenac sodium, ergocalciferol, ezetimibe, fenofibrate, fexofenadine, hydrochlorothiazide, lisinopril, naproxen, olopatadine, tramadol, tramadol, and preservision areds.    Allergies:   Review of patient's allergies indicates:   Allergen Reactions    Mycinette [cetylpyridinium-benzocaine] Diarrhea    Erythromycin Other (See Comments)     Upset stomach    Levaquin [levofloxacin] Nausea And Vomiting        OBJECTIVE     POSTURE: Pt presents with postural abnormalities which include:    [x] Forward Head   [] Increased Lumbar Lordosis   [x] Severe rounded Shoulder  [] Genu Recurvatum   [] Increased Thoracic Kyphosis [] Genu Valgus   [] Trunk Deviated    [] Pes Planus   [] Scapular Winging    [] Other:     STRENGTH:   Upper Extremity  Strength RIGHT   LEFT   Shoulder Flexion 4/5 3+/5 P!   Shoulder Abduction 4/5 3+/5 P!   Shoulder Internal Rotation  4/5 3+/5   Shoulder External Rotation 4/5 3+/5   Elbow Flexion  4/5 4/5   Elbow Extension 4/5 4/5     RANGE OF MOTION: (*) pain and/or dysfunction  Shoulder   Range of Motion Right  Active     Passive Left  Active     Passive Notes   Forward Flexion (180º) WFL  120 P! 130    Shoulder abduction thru  90 P! 100    External Rotation at 90º (90º) out   80    External Rotation at 45º (45º)     -    Internal Rotation at 90º (90º)    45      Elbow AROM/PROM Right Left Notes   Elbow Flexion (150º) WFL WFL    Elbow Extension (0º) WFL WFL      SPECIAL TESTS:   None performed     SENSATION:  [x] Intact to Light Touch     [] Impaired:    PALPATION: tenderness to palpation to anterior left arm, infraspinatus     JOINT MOBILITY: not tested     Intake Outcome Measure for FOTO shoulder Survey    Therapist reviewed FOTO  "scores for Jeana Monsivais on 7/2/2024.   FOTO documents entered into [employer] - see Media section.    Intake Score: 61%       TREATMENT     Total Treatment time (time-based codes) separate from Evaluation: 20 minutes      Jeana received the treatments listed below:      CPT Intervention  JointFocus Duration / Intensity    TE  4 way shld isometric  x10 c 5" holds    TE  table slides   2x10  - fwd  - scaption    TE  doorway stretch  3x30"     MT PROM   L shoulder                                    PLAN          CPT Codes available for Billing:   (10) minutes of Manual therapy (MT) to improve pain and ROM.  (10) minutes of Therapeutic Exercise (TE) to develop strength, endurance, range of motion, and flexibility.  (-) minutes of Neuromuscular Re-Education (NMR)  to improve: Balance, Coordination, Kinesthetic, Sense, Proprioception, and Posture.  (-) minutes of Therapeutic Activities (TA) to improve functional performance.  Unattended Electrical Stimulation (ES) for muscle performance or pain modulation.  Vasopneumatic Device Therapy () for management of swelling/edema. (86511)  BFR: Blood flow restriction applied during exercise  NP or (-): Not Performed       PATIENT EDUCATION AND HOME EXERCISES     Education provided:    - HEP provided   - PT role in POC     Written Home Exercises Provided: yes.  Exercises were reviewed and Jeana was able to demonstrate them prior to the end of the session.  Jeana demonstrated good  understanding of the education provided. See EMR under Patient Instructions for exercises provided during therapy sessions.    ASSESSMENT     Jeana is a 73 y.o. female referred to outpatient Physical Therapy s/p TenJet Tenotomy procedure for Calcific tendinitis infraspinatus supraspinatus left shoulder on 6/19/24 by Dr. Rangel . Pt presents with impairments in the following categories: IMPAIRMENTS: ROM, strength, endurance, posture, core strength and stability, functional movement patterns, and " post-procedure precautions.     Patient prognosis is Good.   Patient will benefit from skilled outpatient Physical Therapy to address the deficits stated above and in the chart below, provide patient /family education, and to maximize patientt's level of independence.     Plan of care discussed with patient: yes   Patient's spiritual, cultural and educational needs considered and patient is agreeable to the plan of care and goals as stated below:     Anticipated Barriers for therapy: none    Medical Necessity is demonstrated by the following  History  Co-morbidities and personal factors that may impact the plan of care [x] LOW: no personal factors / co-morbidities  [] MODERATE: 1-2 personal factors / co-morbidities  [] HIGH: 3+ personal factors / co-morbidities    Moderate / High Support Documentation:   No past medical history on file.     Examination  Body Structures and Functions, activity limitations and participation restrictions that may impact the plan of care [x] LOW: addressing 1-2 elements  [] MODERATE: 3+ elements  [] HIGH: 4+ elements (please support below)    Moderate / High Support Documentation:   [] Head / Neck  [] Spine  [x] Upper Quarter  [] Lower Quarter  [] Range of motion Deficits  [] Gross Symmetry Deficits  [] Strength Deficits  [] Balance Deficits  [] Gait Deficits  [] Unable to participate in daily activities  [] Unable to perform functional tasks  [] Unable to Care for Self or others  [] Community/ Social Life changes due to impairments     Clinical Presentation [x] LOW: stable  [] MODERATE: Evolving  [] HIGH: Unstable     Decision Making/ Complexity Score: low         Short Term Goals:  4 weeks Status  Date Met   PAIN: Pt will report worst pain of 4/10 in order to progress toward max functional ability and improve quality of life. [x] Progressing  [] Met  [] Not Met    FUNCTION: Patient will improve functional outcome test score by 5 points  [x] Progressing  [] Met  [] Not Met    MOBILITY:  Patient will improve shoulder AROM by 20 degree in order to progress towards independence with functional activities.  [x] Progressing  [] Met  [] Not Met    STRENGTH: Patient will improve strength by 1 grade  in order to progress towards independence with functional activities. [x] Progressing  [] Met  [] Not Met    POSTURE: Patient will correct postural deviations in sitting and standing, to decrease pain and promote long term stability.  [x] Progressing  [] Met  [] Not Met    HEP: Patient will demonstrate independence with HEP in order to progress toward functional independence. [x] Progressing  [] Met  [] Not Met      Long Term Goals:  8 weeks Status Date Met   PAIN: Pt will report worst pain of 1/10 in order to progress toward max functional ability and improve quality of life [x] Progressing  [] Met  [] Not Met    FUNCTION: Patient will demonstrate improved function as indicated by an increase of 10 points on FOTO [x] Progressing  [] Met  [] Not Met    MOBILITY: Patient will improve shoulder AROM to WFL in order to return to maximal functional potential and improve quality of life.  [x] Progressing  [] Met  [] Not Met    STRENGTH: Patient will improve strength to 4/5 without pain in order to improve functional independence and quality of life. [x] Progressing  [] Met  [] Not Met    GAIT: Patient will demonstrate normalized gait mechanics with minimal compensation in order to return to PLOF. [x] Progressing  [] Met  [] Not Met    Patient will return to normal ADL's, IADL's, community involvement, recreational activities, and work-related activities with less than or equal to 1/10 pain and maximal function.  [x] Progressing  [] Met  [] Not Met        PLAN   Plan of care Certification: 7/2/2024 to 8/30/2024    Outpatient Physical Therapy 2 times weekly for 8 weeks to include any combination of the following interventions: virtual visits, dry needling, modalities, electrical stimulation (IFC, Pre-Mod, Attended  with Functional Dry Needling), Electrical Stimulation  , Manual Therapy, Moist Heat/ Ice, Neuromuscular Re-ed, Patient Education, Self Care, Therapeutic Exercise, Functional Training, and Therapeutic Activites     Thank you for this referral.    Brandee Hughes, PT, DPT    I CERTIFY THE NEED FOR THESE SERVICES FURNISHED UNDER THIS PLAN OF TREATMENT AND WHILE UNDER MY CARE   Physician's comments:     Physician's Signature: ___________________________________________________

## 2024-07-09 ENCOUNTER — CLINICAL SUPPORT (OUTPATIENT)
Dept: REHABILITATION | Facility: HOSPITAL | Age: 74
End: 2024-07-09
Payer: MEDICARE

## 2024-07-09 DIAGNOSIS — R29.898 IMPAIRED STRENGTH OF SHOULDER MUSCLES: Primary | ICD-10-CM

## 2024-07-09 DIAGNOSIS — M25.612 DECREASED RANGE OF MOTION OF LEFT SHOULDER: ICD-10-CM

## 2024-07-09 PROCEDURE — 97140 MANUAL THERAPY 1/> REGIONS: CPT | Mod: PN

## 2024-07-09 PROCEDURE — 97110 THERAPEUTIC EXERCISES: CPT | Mod: PN

## 2024-07-09 PROCEDURE — 97112 NEUROMUSCULAR REEDUCATION: CPT | Mod: PN

## 2024-07-09 NOTE — PROGRESS NOTES
"[facility]   Physical Therapy Treatment Note     Name: Jeana Monsivais  Clinic Number: [id number]    Therapy Diagnosis:   Encounter Diagnoses   Name Primary?    Impaired strength of shoulder muscles Yes    Decreased range of motion of left shoulder      Physician: Pilo Rangel MD    Visit Date: 7/9/2024    Physician Orders: PT Eval and Treat   Medical Diagnosis from Referral: Calcific tendonitis of left shoulder [M75.32]   Evaluation Date: 7/2/2024  Authorization Period Expiration: 12/31/2024  Plan of Care Expiration: 8/30/24  Progress Note Due: 8/2/24  Visit # / Visits authorized: 1/1 eval  FOTO: 1/3 (last performed 7/2/2024)     PRECAUTIONS: Standard       MD Follow up: 7/31/24 with Dr. Rangel    PTA Visit #: -/5     Time In: 11:00  Time Out: 12:00  Total Billable Time: 60 minutes    SUBJECTIVE     Pt reports: her shoulder has been doing well, however, she did notice increase pain to left shoulder this morning. Pt states she may have slept on it wrong.     She was compliant with home exercise program.  Response to previous treatment: NA  Functional change: NA    Pain: -/10  Location: -    OBJECTIVE     Objective Measures updated at progress report unless specified.     Treatment     Jeana received the treatments listed below:      CPT Intervention  JointFocus Duration / Intensity   MT PROM   STM   L shoulder    TE  table slides  2x10  - fwd  - scaption    TE Nivia's  2'/2'   NMR Inclined 4 shld iso 2x10   NMR Inclined rhythmic oscillations 3x30"    NMR  Inclined ABCs   x2   TE  UT str, B 3x30"    TE Standing alt hugs  x10   TE Seated ER/IR 2x10, YTB            PLAN            CPT Codes available for Billing:   (10) minutes of Manual therapy (MT) to improve pain and ROM.  (25) minutes of Therapeutic Exercise (TE) to develop strength, endurance, range of motion, and flexibility.  (25) minutes of Neuromuscular Re-Education (NMR)  to improve: Balance, Coordination, Kinesthetic, Sense, " Proprioception, and Posture.  (-) minutes of Therapeutic Activities (TA) to improve functional performance.  Unattended Electrical Stimulation (ES) for muscle performance or pain modulation.  Vasopneumatic Device Therapy () for management of swelling/edema. (20924)  BFR: Blood flow restriction applied during exercise  NP or (-): Not Performed    Patient Education and Home Exercises     Home Exercises Provided and Patient Education Provided     Education provided:   - Continue HEP    Written Home Exercises Provided: Patient instructed to cont prior HEP. Exercises were reviewed and Jeana was able to demonstrate them prior to the end of the session.  Jeana demonstrated good  understanding of the education provided. See EMR under Patient Instructions for exercises provided during therapy sessions    See EMR under Patient Instructions for exercises provided     ASSESSMENT     Pt presents to clinic with increased left shoulder pain this morning. Decreased following manual therapy. Pt with increased low back pain with static standing; modified exercises to prevent exacerbation to lower back. Pt with good tolerance to today's session. Progress as tolerated.     Jeana Is progressing well towards her goals.   Pt prognosis is Good.     Pt will continue to benefit from skilled outpatient physical therapy to address the deficits listed in the problem list box on initial evaluation, provide pt/family education and to maximize pt's level of independence in the home and community environment.     Pt's spiritual, cultural and educational needs considered and pt agreeable to plan of care and goals.     Anticipated barriers to physical therapy: none    Goals:      Short Term Goals:  4 weeks Status  Date Met   PAIN: Pt will report worst pain of 4/10 in order to progress toward max functional ability and improve quality of life. [x] Progressing  [] Met  [] Not Met     FUNCTION: Patient will improve functional outcome test score by  5 points  [x] Progressing  [] Met  [] Not Met     MOBILITY: Patient will improve shoulder AROM by 20 degree in order to progress towards independence with functional activities.  [x] Progressing  [] Met  [] Not Met     STRENGTH: Patient will improve strength by 1 grade  in order to progress towards independence with functional activities. [x] Progressing  [] Met  [] Not Met     POSTURE: Patient will correct postural deviations in sitting and standing, to decrease pain and promote long term stability.  [x] Progressing  [] Met  [] Not Met     HEP: Patient will demonstrate independence with HEP in order to progress toward functional independence. [x] Progressing  [] Met  [] Not Met        Long Term Goals:  8 weeks Status Date Met   PAIN: Pt will report worst pain of 1/10 in order to progress toward max functional ability and improve quality of life [x] Progressing  [] Met  [] Not Met     FUNCTION: Patient will demonstrate improved function as indicated by an increase of 10 points on FOTO [x] Progressing  [] Met  [] Not Met     MOBILITY: Patient will improve shoulder AROM to WFL in order to return to maximal functional potential and improve quality of life.  [x] Progressing  [] Met  [] Not Met     STRENGTH: Patient will improve strength to 4/5 without pain in order to improve functional independence and quality of life. [x] Progressing  [] Met  [] Not Met     GAIT: Patient will demonstrate normalized gait mechanics with minimal compensation in order to return to PLOF. [x] Progressing  [] Met  [] Not Met     Patient will return to normal ADL's, IADL's, community involvement, recreational activities, and work-related activities with less than or equal to 1/10 pain and maximal function.  [x] Progressing  [] Met  [] Not Met           PLAN   Plan of care Certification: 7/2/2024 to 8/30/2024     Outpatient Physical Therapy 2 times weekly for 8 weeks to include any combination of the following interventions: virtual visits,  dry needling, modalities, electrical stimulation (IFC, Pre-Mod, Attended with Functional Dry Needling), Electrical Stimulation  , Manual Therapy, Moist Heat/ Ice, Neuromuscular Re-ed, Patient Education, Self Care, Therapeutic Exercise, Functional Training, and Therapeutic Activites      Thank you for this referral.     Brandee Hughes, PT, DPT    Brandee Hughes, PT

## 2024-07-11 ENCOUNTER — CLINICAL SUPPORT (OUTPATIENT)
Dept: REHABILITATION | Facility: HOSPITAL | Age: 74
End: 2024-07-11
Payer: MEDICARE

## 2024-07-11 DIAGNOSIS — M25.612 DECREASED RANGE OF MOTION OF LEFT SHOULDER: ICD-10-CM

## 2024-07-11 DIAGNOSIS — R29.898 IMPAIRED STRENGTH OF SHOULDER MUSCLES: Primary | ICD-10-CM

## 2024-07-11 PROCEDURE — 97112 NEUROMUSCULAR REEDUCATION: CPT | Mod: PN

## 2024-07-11 PROCEDURE — 97110 THERAPEUTIC EXERCISES: CPT | Mod: PN

## 2024-07-11 PROCEDURE — 97140 MANUAL THERAPY 1/> REGIONS: CPT | Mod: PN

## 2024-07-11 NOTE — PROGRESS NOTES
"[facility]   Physical Therapy Treatment Note     Name: Jeana Monsivais  Clinic Number: [id number]    Therapy Diagnosis:   Encounter Diagnoses   Name Primary?    Impaired strength of shoulder muscles Yes    Decreased range of motion of left shoulder      Physician: Pilo Rangel MD    Visit Date: 7/11/2024    Physician Orders: PT Eval and Treat   Medical Diagnosis from Referral: Calcific tendonitis of left shoulder [M75.32]   Evaluation Date: 7/2/2024  Authorization Period Expiration: 12/31/2024  Plan of Care Expiration: 8/30/24  Progress Note Due: 8/2/24  Visit # / Visits authorized: 2/20 treatment (+eval)  FOTO: 1/3 (last performed 7/2/2024)     PRECAUTIONS: Standard       MD Follow up: 7/31/24 with Dr. Rangel    PTA Visit #: -/5     Time In: 11:00  Time Out: 12:00  Total Billable Time: 60 minutes    SUBJECTIVE     Pt reports: improvement with left shoulder pain following last session. Reports pain to left cervical today.    She was compliant with home exercise program.  Response to previous treatment: NA  Functional change: NA    Pain: -/10  Location: -    OBJECTIVE     Objective Measures updated at progress report unless specified.     Treatment     Jeana received the treatments listed below:      CPT Intervention  JointFocus Duration / Intensity  7/11   MT PROM   STM  L shoulder  L upper trap    TE  table slides  NT   TE Pulley's  -   NMR Inclined 4 shld iso 2x10   NMR Inclined rhythmic oscillations NT   NMR  Inclined ABCs   x2   TE  UT str, B 3x30"   TE inclined alt hugs x10   TE Seated ER/IR 2x20, YSC - modified to walk outs   NMR Shoulder rolls  2x10   NMR Scapular retraction  2x10                   PLAN            CPT Codes available for Billing:   (10) minutes of Manual therapy (MT) to improve pain and ROM.  (20) minutes of Therapeutic Exercise (TE) to develop strength, endurance, range of motion, and flexibility.  (30) minutes of Neuromuscular Re-Education (NMR)  to improve: " Balance, Coordination, Kinesthetic, Sense, Proprioception, and Posture.  (-) minutes of Therapeutic Activities (TA) to improve functional performance.  Unattended Electrical Stimulation (ES) for muscle performance or pain modulation.  Vasopneumatic Device Therapy () for management of swelling/edema. (23697)  BFR: Blood flow restriction applied during exercise  NP or (-): Not Performed    Patient Education and Home Exercises     Home Exercises Provided and Patient Education Provided     Education provided:   - Continue HEP    Written Home Exercises Provided: Patient instructed to cont prior HEP. Exercises were reviewed and Jeana was able to demonstrate them prior to the end of the session.  Jeana demonstrated good  understanding of the education provided. See EMR under Patient Instructions for exercises provided during therapy sessions    See EMR under Patient Instructions for exercises provided     ASSESSMENT     Pt with increased tension to left upper trap; reduced following manual to area. Improving passive shoulder flexion today. Pt with increased pain performing seated light resisted external rotation; modified to walk outs for isometric loading. Incorporated exercises to improve postural strengthening. Progress as tolerated.     Jeana Is progressing well towards her goals.   Pt prognosis is Good.     Pt will continue to benefit from skilled outpatient physical therapy to address the deficits listed in the problem list box on initial evaluation, provide pt/family education and to maximize pt's level of independence in the home and community environment.     Pt's spiritual, cultural and educational needs considered and pt agreeable to plan of care and goals.     Anticipated barriers to physical therapy: none    Goals:      Short Term Goals:  4 weeks Status  Date Met   PAIN: Pt will report worst pain of 4/10 in order to progress toward max functional ability and improve quality of life. [x] Progressing  []  Met  [] Not Met     FUNCTION: Patient will improve functional outcome test score by 5 points  [x] Progressing  [] Met  [] Not Met     MOBILITY: Patient will improve shoulder AROM by 20 degree in order to progress towards independence with functional activities.  [x] Progressing  [] Met  [] Not Met     STRENGTH: Patient will improve strength by 1 grade  in order to progress towards independence with functional activities. [x] Progressing  [] Met  [] Not Met     POSTURE: Patient will correct postural deviations in sitting and standing, to decrease pain and promote long term stability.  [x] Progressing  [] Met  [] Not Met     HEP: Patient will demonstrate independence with HEP in order to progress toward functional independence. [x] Progressing  [] Met  [] Not Met        Long Term Goals:  8 weeks Status Date Met   PAIN: Pt will report worst pain of 1/10 in order to progress toward max functional ability and improve quality of life [x] Progressing  [] Met  [] Not Met     FUNCTION: Patient will demonstrate improved function as indicated by an increase of 10 points on FOTO [x] Progressing  [] Met  [] Not Met     MOBILITY: Patient will improve shoulder AROM to WFL in order to return to maximal functional potential and improve quality of life.  [x] Progressing  [] Met  [] Not Met     STRENGTH: Patient will improve strength to 4/5 without pain in order to improve functional independence and quality of life. [x] Progressing  [] Met  [] Not Met     GAIT: Patient will demonstrate normalized gait mechanics with minimal compensation in order to return to PLOF. [x] Progressing  [] Met  [] Not Met     Patient will return to normal ADL's, IADL's, community involvement, recreational activities, and work-related activities with less than or equal to 1/10 pain and maximal function.  [x] Progressing  [] Met  [] Not Met           PLAN   Plan of care Certification: 7/2/2024 to 8/30/2024     Outpatient Physical Therapy 2 times weekly for 8  weeks to include any combination of the following interventions: virtual visits, dry needling, modalities, electrical stimulation (IFC, Pre-Mod, Attended with Functional Dry Needling), Electrical Stimulation  , Manual Therapy, Moist Heat/ Ice, Neuromuscular Re-ed, Patient Education, Self Care, Therapeutic Exercise, Functional Training, and Therapeutic Activites      Thank you for this referral.     Brandee Hughes, PT, DPT    Brandee Hughes, PT

## 2024-07-16 ENCOUNTER — CLINICAL SUPPORT (OUTPATIENT)
Dept: REHABILITATION | Facility: HOSPITAL | Age: 74
End: 2024-07-16
Attending: STUDENT IN AN ORGANIZED HEALTH CARE EDUCATION/TRAINING PROGRAM
Payer: MEDICARE

## 2024-07-16 DIAGNOSIS — R29.898 IMPAIRED STRENGTH OF SHOULDER MUSCLES: Primary | ICD-10-CM

## 2024-07-16 DIAGNOSIS — M25.612 DECREASED RANGE OF MOTION OF LEFT SHOULDER: ICD-10-CM

## 2024-07-16 PROCEDURE — 97112 NEUROMUSCULAR REEDUCATION: CPT | Mod: PN

## 2024-07-16 PROCEDURE — 97110 THERAPEUTIC EXERCISES: CPT | Mod: PN

## 2024-07-16 PROCEDURE — 97140 MANUAL THERAPY 1/> REGIONS: CPT | Mod: PN
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
## 2024-07-18 ENCOUNTER — CLINICAL SUPPORT (OUTPATIENT)
Dept: REHABILITATION | Facility: HOSPITAL | Age: 74
End: 2024-07-18
Payer: MEDICARE

## 2024-07-18 DIAGNOSIS — R29.898 IMPAIRED STRENGTH OF SHOULDER MUSCLES: Primary | ICD-10-CM

## 2024-07-18 DIAGNOSIS — M25.612 DECREASED RANGE OF MOTION OF LEFT SHOULDER: ICD-10-CM

## 2024-07-18 PROCEDURE — 97112 NEUROMUSCULAR REEDUCATION: CPT | Mod: PN

## 2024-07-18 PROCEDURE — 97110 THERAPEUTIC EXERCISES: CPT | Mod: PN

## 2024-07-18 PROCEDURE — 97140 MANUAL THERAPY 1/> REGIONS: CPT | Mod: PN

## 2024-07-18 NOTE — PROGRESS NOTES
[facility]   Physical Therapy Treatment Note     Name: Jeana Monsivais  Clinic Number: [id number]    Therapy Diagnosis:   Encounter Diagnoses   Name Primary?    Impaired strength of shoulder muscles Yes    Decreased range of motion of left shoulder      Physician: Pilo Rangel MD    Visit Date: 7/18/2024    Physician Orders: PT Eval and Treat   Medical Diagnosis from Referral: Calcific tendonitis of left shoulder [M75.32]   Evaluation Date: 7/2/2024  Authorization Period Expiration: 12/31/2024  Plan of Care Expiration: 8/30/24  Progress Note Due: 8/2/24  Visit # / Visits authorized: 2/20 treatment (+eval)  FOTO: 1/3 (last performed 7/2/2024)     PRECAUTIONS: Standard       MD Follow up: 7/31/24 with Dr. Rangel    PTA Visit #: -/5     Time In: 1:00  Time Out: 2:00  Total Billable Time: 60 minutes    SUBJECTIVE     Pt reports: neck is doing better compared to last visit, however, she still has a lot of pain. Pt states it can also be related to her stress; her granddaughter is undergoing surgery.     She was compliant with home exercise program.  Response to previous treatment: NA  Functional change: NA    Pain: -/10  Location: -    OBJECTIVE     Objective Measures updated at progress report unless specified.     Treatment     Jeana received the treatments listed below:      CPT Intervention  JointFocus Duration / Intensity  7/16   MT manual MFR to B UT  L shoulder PROM   TE Nivia's  2'/2'   TE  UT str, B 3x30   TE 4 way shoulder  x10, RSC   TE Wall crawls (standing or sitting) NT   NMR  Inclined ABCs  NT   NMR rows 3x10, RTB   NMR Seated August shoulder ER 2x10    NMR Seated HSA  x10, RTB   PLAN   Standing body blade IR/ER, +standing ABCs ball at wall      CPT Codes available for Billing:   (15) minutes of Manual therapy (MT) to improve pain and ROM.  (25) minutes of Therapeutic Exercise (TE) to develop strength, endurance, range of motion, and flexibility.  (20) minutes of Neuromuscular  Re-Education (NMR)  to improve: Balance, Coordination, Kinesthetic, Sense, Proprioception, and Posture.  (-) minutes of Therapeutic Activities (TA) to improve functional performance.  Unattended Electrical Stimulation (ES) for muscle performance or pain modulation.  Vasopneumatic Device Therapy () for management of swelling/edema. (09061)  BFR: Blood flow restriction applied during exercise  NP or (-): Not Performed    Patient Education and Home Exercises     Home Exercises Provided and Patient Education Provided     Education provided:   - Continue HEP    Written Home Exercises Provided: Patient instructed to cont prior HEP. Exercises were reviewed and Jeana was able to demonstrate them prior to the end of the session.  Jeana demonstrated good  understanding of the education provided. See EMR under Patient Instructions for exercises provided during therapy sessions    See EMR under Patient Instructions for exercises provided     ASSESSMENT     Performed manual to decreased pain to lower cervical region and improve shoulder ROM. Pt with mild regression in passive shoulder flexion; potentially due to increase stress. Reduced pain at end of session. Progress as tolerated.     Jeana Is progressing well towards her goals.   Pt prognosis is Good.     Pt will continue to benefit from skilled outpatient physical therapy to address the deficits listed in the problem list box on initial evaluation, provide pt/family education and to maximize pt's level of independence in the home and community environment.     Pt's spiritual, cultural and educational needs considered and pt agreeable to plan of care and goals.     Anticipated barriers to physical therapy: none    Goals:      Short Term Goals:  4 weeks Status  Date Met   PAIN: Pt will report worst pain of 4/10 in order to progress toward max functional ability and improve quality of life. [x] Progressing  [] Met  [] Not Met     FUNCTION: Patient will improve functional  outcome test score by 5 points  [x] Progressing  [] Met  [] Not Met     MOBILITY: Patient will improve shoulder AROM by 20 degree in order to progress towards independence with functional activities.  [x] Progressing  [] Met  [] Not Met     STRENGTH: Patient will improve strength by 1 grade  in order to progress towards independence with functional activities. [x] Progressing  [] Met  [] Not Met     POSTURE: Patient will correct postural deviations in sitting and standing, to decrease pain and promote long term stability.  [x] Progressing  [] Met  [] Not Met     HEP: Patient will demonstrate independence with HEP in order to progress toward functional independence. [x] Progressing  [] Met  [] Not Met        Long Term Goals:  8 weeks Status Date Met   PAIN: Pt will report worst pain of 1/10 in order to progress toward max functional ability and improve quality of life [x] Progressing  [] Met  [] Not Met     FUNCTION: Patient will demonstrate improved function as indicated by an increase of 10 points on FOTO [x] Progressing  [] Met  [] Not Met     MOBILITY: Patient will improve shoulder AROM to WFL in order to return to maximal functional potential and improve quality of life.  [x] Progressing  [] Met  [] Not Met     STRENGTH: Patient will improve strength to 4/5 without pain in order to improve functional independence and quality of life. [x] Progressing  [] Met  [] Not Met     GAIT: Patient will demonstrate normalized gait mechanics with minimal compensation in order to return to PLOF. [x] Progressing  [] Met  [] Not Met     Patient will return to normal ADL's, IADL's, community involvement, recreational activities, and work-related activities with less than or equal to 1/10 pain and maximal function.  [x] Progressing  [] Met  [] Not Met           PLAN   Plan of care Certification: 7/2/2024 to 8/30/2024     Outpatient Physical Therapy 2 times weekly for 8 weeks to include any combination of the following  interventions: virtual visits, dry needling, modalities, electrical stimulation (IFC, Pre-Mod, Attended with Functional Dry Needling), Electrical Stimulation  , Manual Therapy, Moist Heat/ Ice, Neuromuscular Re-ed, Patient Education, Self Care, Therapeutic Exercise, Functional Training, and Therapeutic Activites      Thank you for this referral.     Brandee Hughes, PT, DPT    Brandee Hughes, PT

## 2024-07-24 ENCOUNTER — CLINICAL SUPPORT (OUTPATIENT)
Dept: REHABILITATION | Facility: HOSPITAL | Age: 74
End: 2024-07-24
Payer: MEDICARE

## 2024-07-24 DIAGNOSIS — M25.612 DECREASED RANGE OF MOTION OF LEFT SHOULDER: ICD-10-CM

## 2024-07-24 DIAGNOSIS — R29.898 IMPAIRED STRENGTH OF SHOULDER MUSCLES: Primary | ICD-10-CM

## 2024-07-24 PROCEDURE — 97112 NEUROMUSCULAR REEDUCATION: CPT | Mod: KX,PN

## 2024-07-24 PROCEDURE — 97110 THERAPEUTIC EXERCISES: CPT | Mod: KX,PN

## 2024-07-24 NOTE — PROGRESS NOTES
"  [facility]   Physical Therapy Treatment Note     Name: Jeana Monsivais  Clinic Number: [id number]    Therapy Diagnosis:   Encounter Diagnoses   Name Primary?    Impaired strength of shoulder muscles Yes    Decreased range of motion of left shoulder      Physician: Pilo Rangel MD    Visit Date: 7/24/2024    Physician Orders: PT Eval and Treat   Medical Diagnosis from Referral: Calcific tendonitis of left shoulder [M75.32]   Evaluation Date: 7/2/2024  Authorization Period Expiration: 12/31/2024  Plan of Care Expiration: 8/30/24  Progress Note Due: 8/2/24  Visit # / Visits authorized: 5/20 treatment (+eval)  FOTO: 1/3 (last performed 7/2/2024)     PRECAUTIONS: Standard       MD Follow up: 7/31/24 with Dr. Rangel    PTA Visit #: -/5     Time In: 11:00  Time Out: 12:00  Total Billable Time: 30 minutes 1:1 PT and 30 minutes with PT tech     SUBJECTIVE     Pt reports: she continues to note pain and tightness to her upper trap region. Pt states she believes its correlated with increase driving and stress. Pt also reports increase low back pain with static standing during home shoulder exercises.     She was compliant with home exercise program.  Response to previous treatment: NA  Functional change: NA    Pain: -/10  Location: -    OBJECTIVE     Objective Measures updated at progress report unless specified.     Treatment     Jeana received the treatments listed below:      CPT Intervention  JointFocus Duration / Intensity  7/24   MT manual Myofascia release to B UT  L shoulder PROM   TE Pulley's  NT    TE  UT str, B 3x30"   TE 4 way shoulder Seated  2x10, YSB    TE Seated Wall crawls with lift off  3x10    NMR  seated ABCs  X2   NMR rows 3x10, RTB    NMR  Seated August shoulder ER 2x10, RTB   NMR  Seated HSA 2x10. RTB    NMR  Seated body blade IR/ER 3x15"    PLAN         CPT Codes available for Billing:   (15) minutes of Manual therapy (MT) to improve pain and ROM.  (25) minutes of " Therapeutic Exercise (TE) to develop strength, endurance, range of motion, and flexibility.  (20) minutes of Neuromuscular Re-Education (NMR)  to improve: Balance, Coordination, Kinesthetic, Sense, Proprioception, and Posture.  (-) minutes of Therapeutic Activities (TA) to improve functional performance.  Unattended Electrical Stimulation (ES) for muscle performance or pain modulation.  Vasopneumatic Device Therapy () for management of swelling/edema. (27862)  BFR: Blood flow restriction applied during exercise  NP or (-): Not Performed    Patient Education and Home Exercises     Home Exercises Provided and Patient Education Provided     Education provided:   - Continue HEP    Written Home Exercises Provided: Patient instructed to cont prior HEP. Exercises were reviewed and Jeana was able to demonstrate them prior to the end of the session.  Jeana demonstrated good  understanding of the education provided. See EMR under Patient Instructions for exercises provided during therapy sessions    See EMR under Patient Instructions for exercises provided     ASSESSMENT     Pt with better tolerance to left shoulder PROM. Modified exercises to seated exercises in order to reduce increase lower back pain. Performed manual to reduce teision to area. Pt with no increase of pain at end of today's session.     Jeana Is progressing well towards her goals.   Pt prognosis is Good.     Pt will continue to benefit from skilled outpatient physical therapy to address the deficits listed in the problem list box on initial evaluation, provide pt/family education and to maximize pt's level of independence in the home and community environment.     Pt's spiritual, cultural and educational needs considered and pt agreeable to plan of care and goals.     Anticipated barriers to physical therapy: none    Goals:      Short Term Goals:  4 weeks Status  Date Met   PAIN: Pt will report worst pain of 4/10 in order to progress toward max  functional ability and improve quality of life. [x] Progressing  [] Met  [] Not Met     FUNCTION: Patient will improve functional outcome test score by 5 points  [x] Progressing  [] Met  [] Not Met     MOBILITY: Patient will improve shoulder AROM by 20 degree in order to progress towards independence with functional activities.  [x] Progressing  [] Met  [] Not Met     STRENGTH: Patient will improve strength by 1 grade  in order to progress towards independence with functional activities. [x] Progressing  [] Met  [] Not Met     POSTURE: Patient will correct postural deviations in sitting and standing, to decrease pain and promote long term stability.  [x] Progressing  [] Met  [] Not Met     HEP: Patient will demonstrate independence with HEP in order to progress toward functional independence. [x] Progressing  [] Met  [] Not Met        Long Term Goals:  8 weeks Status Date Met   PAIN: Pt will report worst pain of 1/10 in order to progress toward max functional ability and improve quality of life [x] Progressing  [] Met  [] Not Met     FUNCTION: Patient will demonstrate improved function as indicated by an increase of 10 points on FOTO [x] Progressing  [] Met  [] Not Met     MOBILITY: Patient will improve shoulder AROM to WFL in order to return to maximal functional potential and improve quality of life.  [x] Progressing  [] Met  [] Not Met     STRENGTH: Patient will improve strength to 4/5 without pain in order to improve functional independence and quality of life. [x] Progressing  [] Met  [] Not Met     GAIT: Patient will demonstrate normalized gait mechanics with minimal compensation in order to return to PLOF. [x] Progressing  [] Met  [] Not Met     Patient will return to normal ADL's, IADL's, community involvement, recreational activities, and work-related activities with less than or equal to 1/10 pain and maximal function.  [x] Progressing  [] Met  [] Not Met           PLAN   Plan of care Certification:  7/2/2024 to 8/30/2024     Outpatient Physical Therapy 2 times weekly for 8 weeks to include any combination of the following interventions: virtual visits, dry needling, modalities, electrical stimulation (IFC, Pre-Mod, Attended with Functional Dry Needling), Electrical Stimulation  , Manual Therapy, Moist Heat/ Ice, Neuromuscular Re-ed, Patient Education, Self Care, Therapeutic Exercise, Functional Training, and Therapeutic Activites      Thank you for this referral.     Brandee Hughes, PT, DPT    Brandee Hughes, PT

## 2024-07-25 ENCOUNTER — CLINICAL SUPPORT (OUTPATIENT)
Dept: REHABILITATION | Facility: HOSPITAL | Age: 74
End: 2024-07-25
Payer: MEDICARE

## 2024-07-25 DIAGNOSIS — R29.898 IMPAIRED STRENGTH OF SHOULDER MUSCLES: Primary | ICD-10-CM

## 2024-07-25 DIAGNOSIS — M25.612 DECREASED RANGE OF MOTION OF LEFT SHOULDER: ICD-10-CM

## 2024-07-25 PROCEDURE — 97140 MANUAL THERAPY 1/> REGIONS: CPT | Mod: PN

## 2024-07-25 PROCEDURE — 97112 NEUROMUSCULAR REEDUCATION: CPT | Mod: PN

## 2024-07-25 PROCEDURE — 97110 THERAPEUTIC EXERCISES: CPT | Mod: PN

## 2024-07-26 NOTE — PROGRESS NOTES
"  [facility]   Physical Therapy Treatment Note     Name: Jeana Monsivais  Clinic Number: [id number]    Therapy Diagnosis:   Encounter Diagnoses   Name Primary?    Impaired strength of shoulder muscles Yes    Decreased range of motion of left shoulder      Physician: Pilo Rangel MD    Visit Date: 7/25/2024    Physician Orders: PT Eval and Treat   Medical Diagnosis from Referral: Calcific tendonitis of left shoulder [M75.32]   Evaluation Date: 7/2/2024  Authorization Period Expiration: 12/31/2024  Plan of Care Expiration: 8/30/24  Progress Note Due: 8/2/24  Visit # / Visits authorized: 6/20 treatment (+eval)  FOTO: 2/3 (last performed 7/25/2024)     PRECAUTIONS: Standard       MD Follow up: 7/31/24 with Dr. Rangel    PTA Visit #: -/5     Time In: 11:00  Time Out: 12:00  Total Billable Time: 60 minute    SUBJECTIVE     Pt reports: her neck is still causing her great discomfort. She believes it can be coming from her driving and increase stress     She was compliant with home exercise program.  Response to previous treatment: NA  Functional change: NA    Pain: 7/10  Location: neck pain    OBJECTIVE     Objective Measures updated at progress report unless specified.     Treatment     Jeana received the treatments listed below:      CPT Intervention  JointFocus Duration / Intensity  7/25   MT manual -L shld PROM  -MWM to L UT    MT  FDN B UT    TE Pulley's  2'/2'   TE  UT str, B 3x30"   TE 4 way shoulder Seated  2x10, RTB   TE Seated Wall crawls with lift off  NT   NMR  seated ABCs  NT    NMR rows 3x10, RTB   NMR  Seated August shoulder ER 2x10, RTB   NMR  Seated HSA 2x10, RTB    NMR  Seated body blade IR/ER NT    PLAN           CPT Codes available for Billing:   (15) minutes of Manual therapy (MT) to improve pain and ROM.  (25) minutes of Therapeutic Exercise (TE) to develop strength, endurance, range of motion, and flexibility.  (15) minutes of Neuromuscular Re-Education (NMR)  to improve: " Balance, Coordination, Kinesthetic, Sense, Proprioception, and Posture.  (-) minutes of Therapeutic Activities (TA) to improve functional performance.  Unattended Electrical Stimulation (ES) for muscle performance or pain modulation.  Vasopneumatic Device Therapy () for management of swelling/edema. (78027)  BFR: Blood flow restriction applied during exercise  NP or (-): Not Performed    Patient Education and Home Exercises     Home Exercises Provided and Patient Education Provided     Education provided:   - Continue HEP    Written Home Exercises Provided: Patient instructed to cont prior HEP. Exercises were reviewed and Jeana was able to demonstrate them prior to the end of the session.  Jeana demonstrated good  understanding of the education provided. See EMR under Patient Instructions for exercises provided during therapy sessions    See EMR under Patient Instructions for exercises provided     ASSESSMENT     Pt with increased pain to left upper trap during shoulder exercises. With pt's consent performed dry needling to bilateral upper trap to reduce tension and provided heat to cervical region at end of session. Resume shoulder and postural strengthening exercises next visit     Jeana Is progressing well towards her goals.   Pt prognosis is Good.     Pt will continue to benefit from skilled outpatient physical therapy to address the deficits listed in the problem list box on initial evaluation, provide pt/family education and to maximize pt's level of independence in the home and community environment.     Pt's spiritual, cultural and educational needs considered and pt agreeable to plan of care and goals.     Anticipated barriers to physical therapy: none    Goals:      Short Term Goals:  4 weeks Status  Date Met   PAIN: Pt will report worst pain of 4/10 in order to progress toward max functional ability and improve quality of life. [x] Progressing  [] Met  [] Not Met     FUNCTION: Patient will improve  functional outcome test score by 5 points  [x] Progressing  [] Met  [] Not Met     MOBILITY: Patient will improve shoulder AROM by 20 degree in order to progress towards independence with functional activities.  [x] Progressing  [] Met  [] Not Met     STRENGTH: Patient will improve strength by 1 grade  in order to progress towards independence with functional activities. [x] Progressing  [] Met  [] Not Met     POSTURE: Patient will correct postural deviations in sitting and standing, to decrease pain and promote long term stability.  [x] Progressing  [] Met  [] Not Met     HEP: Patient will demonstrate independence with HEP in order to progress toward functional independence. [x] Progressing  [] Met  [] Not Met        Long Term Goals:  8 weeks Status Date Met   PAIN: Pt will report worst pain of 1/10 in order to progress toward max functional ability and improve quality of life [x] Progressing  [] Met  [] Not Met     FUNCTION: Patient will demonstrate improved function as indicated by an increase of 10 points on FOTO [x] Progressing  [] Met  [] Not Met     MOBILITY: Patient will improve shoulder AROM to WFL in order to return to maximal functional potential and improve quality of life.  [x] Progressing  [] Met  [] Not Met     STRENGTH: Patient will improve strength to 4/5 without pain in order to improve functional independence and quality of life. [x] Progressing  [] Met  [] Not Met     GAIT: Patient will demonstrate normalized gait mechanics with minimal compensation in order to return to PLOF. [x] Progressing  [] Met  [] Not Met     Patient will return to normal ADL's, IADL's, community involvement, recreational activities, and work-related activities with less than or equal to 1/10 pain and maximal function.  [x] Progressing  [] Met  [] Not Met           PLAN   Plan of care Certification: 7/2/2024 to 8/30/2024     Outpatient Physical Therapy 2 times weekly for 8 weeks to include any combination of the following  interventions: virtual visits, dry needling, modalities, electrical stimulation (IFC, Pre-Mod, Attended with Functional Dry Needling), Electrical Stimulation  , Manual Therapy, Moist Heat/ Ice, Neuromuscular Re-ed, Patient Education, Self Care, Therapeutic Exercise, Functional Training, and Therapeutic Activites      Thank you for this referral.     Brandee Hughes, PT, DPT    Brandee Hughes, PT

## 2024-07-29 ENCOUNTER — TELEPHONE (OUTPATIENT)
Dept: SPORTS MEDICINE | Facility: CLINIC | Age: 74
End: 2024-07-29
Payer: COMMERCIAL

## 2024-07-29 NOTE — TELEPHONE ENCOUNTER
----- Message from Ana Leal sent at 7/29/2024 10:17 AM CDT -----  Contact: [phone]  Patient needs first available appointment due to rescheduling 7/31  visit. Pt is requesting to be schedule after she finish physical therapy around 8/5. Please call patient at [phone]. Thanks KB

## 2024-07-30 ENCOUNTER — CLINICAL SUPPORT (OUTPATIENT)
Dept: REHABILITATION | Facility: HOSPITAL | Age: 74
End: 2024-07-30
Payer: MEDICARE

## 2024-07-30 DIAGNOSIS — M25.612 DECREASED RANGE OF MOTION OF LEFT SHOULDER: ICD-10-CM

## 2024-07-30 DIAGNOSIS — R29.898 IMPAIRED STRENGTH OF SHOULDER MUSCLES: Primary | ICD-10-CM

## 2024-07-30 PROCEDURE — 97110 THERAPEUTIC EXERCISES: CPT | Mod: PN

## 2024-07-30 PROCEDURE — 97112 NEUROMUSCULAR REEDUCATION: CPT | Mod: PN

## 2024-07-30 PROCEDURE — 97140 MANUAL THERAPY 1/> REGIONS: CPT | Mod: PN

## 2024-07-30 NOTE — PROGRESS NOTES
[facility]   Physical Therapy Treatment Note     Name: Jeana Monsivais  Clinic Number: [id number]    Therapy Diagnosis:   Encounter Diagnoses   Name Primary?    Impaired strength of shoulder muscles Yes    Decreased range of motion of left shoulder      Physician: Pilo Rangel MD    Visit Date: 7/30/2024    Physician Orders: PT Eval and Treat   Medical Diagnosis from Referral: Calcific tendonitis of left shoulder [M75.32]   Evaluation Date: 7/2/2024  Authorization Period Expiration: 12/31/2024  Plan of Care Expiration: 8/30/24  Progress Note Due: 8/2/24  Visit # / Visits authorized: 6/20 treatment (+eval)  FOTO: 2/3 (last performed 7/25/2024)     PRECAUTIONS: Standard       MD Follow up: 8/15/24 with Dr. Rangel    PTA Visit #: -/5     Time In: 11:00  Time Out: 12:00  Total Billable Time: 60 minute    SUBJECTIVE     Pt reports: her neck is better after last session, however, she does still note lingering pain to left shoulder and lateral arm.     She was compliant with home exercise program.  Response to previous treatment: NA  Functional change: NA    Pain: 7/10  Location: neck pain    OBJECTIVE     Objective Measures updated at progress report unless specified.     7/30/24  L shoulder flexion = 4/5  L shoulder abduction = 4+/5  L shoulder IR = 4+/5  L shoulder ER = 4+/5    Treatment     Jeana received the treatments listed below:      CPT Intervention  JointFocus Duration / Intensity  7/30   MT manual STM to upper trap    MT  FDN R UT and lateral triceps   TE Pulley's  2/2   TE  UT str, B 3x30   TE Seated Wall crawls with lift off  3x10   NMR  seated ABCs  x2   NMR rows NT   NMR  Seated August shoulder ER 3x10, RTB   NMR  Seated HSA 3x10, RTB   NMR  Seated body blade IR/ER 3x15   NMR Walk clocks 2x10   PLAN          CPT Codes available for Billing:   (15) minutes of Manual therapy (MT) to improve pain and ROM.  (25) minutes of Therapeutic Exercise (TE) to develop strength, endurance,  range of motion, and flexibility.  (15) minutes of Neuromuscular Re-Education (NMR)  to improve: Balance, Coordination, Kinesthetic, Sense, Proprioception, and Posture.  (-) minutes of Therapeutic Activities (TA) to improve functional performance.  Unattended Electrical Stimulation (ES) for muscle performance or pain modulation.  Vasopneumatic Device Therapy () for management of swelling/edema. (60126)  BFR: Blood flow restriction applied during exercise  NP or (-): Not Performed    Patient Education and Home Exercises     Home Exercises Provided and Patient Education Provided     Education provided:   - Continue HEP    Written Home Exercises Provided: Patient instructed to cont prior HEP. Exercises were reviewed and Jeana was able to demonstrate them prior to the end of the session.  Jeana demonstrated good  understanding of the education provided. See EMR under Patient Instructions for exercises provided during therapy sessions    See EMR under Patient Instructions for exercises provided     ASSESSMENT     Performed dry needling to further reduce tension to left cervical and lateral arm; significant relief following manual. Resumed exercises and incorporated additional shoulder stability at end of session. Pt progressing with good strength to left arm and no pain at this time.     Jeana Is progressing well towards her goals.   Pt prognosis is Good.     Pt will continue to benefit from skilled outpatient physical therapy to address the deficits listed in the problem list box on initial evaluation, provide pt/family education and to maximize pt's level of independence in the home and community environment.     Pt's spiritual, cultural and educational needs considered and pt agreeable to plan of care and goals.     Anticipated barriers to physical therapy: none    Goals:      Short Term Goals:  4 weeks Status  Date Met   PAIN: Pt will report worst pain of 4/10 in order to progress toward max functional ability  and improve quality of life. [x] Progressing  [] Met  [] Not Met     FUNCTION: Patient will improve functional outcome test score by 5 points  [x] Progressing  [] Met  [] Not Met     MOBILITY: Patient will improve shoulder AROM by 20 degree in order to progress towards independence with functional activities.  [x] Progressing  [] Met  [] Not Met     STRENGTH: Patient will improve strength by 1 grade  in order to progress towards independence with functional activities. [x] Progressing  [] Met  [] Not Met     POSTURE: Patient will correct postural deviations in sitting and standing, to decrease pain and promote long term stability.  [x] Progressing  [] Met  [] Not Met     HEP: Patient will demonstrate independence with HEP in order to progress toward functional independence. [x] Progressing  [] Met  [] Not Met        Long Term Goals:  8 weeks Status Date Met   PAIN: Pt will report worst pain of 1/10 in order to progress toward max functional ability and improve quality of life [x] Progressing  [] Met  [] Not Met     FUNCTION: Patient will demonstrate improved function as indicated by an increase of 10 points on FOTO [x] Progressing  [] Met  [] Not Met     MOBILITY: Patient will improve shoulder AROM to WFL in order to return to maximal functional potential and improve quality of life.  [x] Progressing  [] Met  [] Not Met     STRENGTH: Patient will improve strength to 4/5 without pain in order to improve functional independence and quality of life. [x] Progressing  [] Met  [] Not Met     GAIT: Patient will demonstrate normalized gait mechanics with minimal compensation in order to return to PLOF. [x] Progressing  [] Met  [] Not Met     Patient will return to normal ADL's, IADL's, community involvement, recreational activities, and work-related activities with less than or equal to 1/10 pain and maximal function.  [x] Progressing  [] Met  [] Not Met           PLAN   Plan of care Certification: 7/2/2024 to 8/30/2024      Outpatient Physical Therapy 2 times weekly for 8 weeks to include any combination of the following interventions: virtual visits, dry needling, modalities, electrical stimulation (IFC, Pre-Mod, Attended with Functional Dry Needling), Electrical Stimulation  , Manual Therapy, Moist Heat/ Ice, Neuromuscular Re-ed, Patient Education, Self Care, Therapeutic Exercise, Functional Training, and Therapeutic Activites      Thank you for this referral.     Brandee Hughes, PT, DPT    Brandee Hughes, PT

## 2024-08-01 ENCOUNTER — CLINICAL SUPPORT (OUTPATIENT)
Dept: REHABILITATION | Facility: HOSPITAL | Age: 74
End: 2024-08-01
Payer: MEDICARE

## 2024-08-01 DIAGNOSIS — M25.612 DECREASED RANGE OF MOTION OF LEFT SHOULDER: ICD-10-CM

## 2024-08-01 DIAGNOSIS — R29.898 IMPAIRED STRENGTH OF SHOULDER MUSCLES: Primary | ICD-10-CM

## 2024-08-01 PROCEDURE — 97140 MANUAL THERAPY 1/> REGIONS: CPT | Mod: PN

## 2024-08-01 PROCEDURE — 97110 THERAPEUTIC EXERCISES: CPT | Mod: PN

## 2024-08-01 PROCEDURE — 97112 NEUROMUSCULAR REEDUCATION: CPT | Mod: PN

## 2024-08-01 NOTE — PROGRESS NOTES
"[facility]   Physical Therapy Treatment Note     Name: Jeana Monsivais  Clinic Number: [id number]    Therapy Diagnosis:   Encounter Diagnoses   Name Primary?    Impaired strength of shoulder muscles Yes    Decreased range of motion of left shoulder      Physician: Pilo Rangel MD    Visit Date: 8/1/2024    Physician Orders: PT Eval and Treat   Medical Diagnosis from Referral: Calcific tendonitis of left shoulder [M75.32]   Evaluation Date: 7/2/2024  Authorization Period Expiration: 12/31/2024  Plan of Care Expiration: 8/30/24  Progress Note Due: 8/2/24  Visit # / Visits authorized: 8/20 treatment (+eval)  FOTO: 3/4 (last performed 8/1/2024)     PRECAUTIONS: Standard       MD Follow up: 8/15/24 with Dr. Rangel    PTA Visit #: -/5     Time In: 11:00  Time Out: 12:00  Total Billable Time: 60 minute    SUBJECTIVE     Pt reports: her neck is better. Mild pain still located in the neck. Denies any pain to shoulder and is agreeable for discharge.     She was compliant with home exercise program.  Response to previous treatment: NA  Functional change: NA    Pain: 0/10  Location: shoulder    OBJECTIVE     7/30/24  L shoulder flexion = 4/5  L shoulder abduction = 4+/5  L shoulder IR = 4+/5  L shoulder ER = 4+/5    L Functional IR = T9  L Functional ER = C7    Treatment     Jeana received the treatments listed below:      CPT Intervention  JointFocus Duration / Intensity  7/30   MT manual STM to upper trap    MT  FDN R UT and lateral triceps   TE UT str, B 3x30"    NMR Shoulder rolls  2x10   NMR August shoulder ER 2x10, GTB    NMR Shoulder HSA 2x10, GTB    TE Standing shoulder flexion with cane 2x10, GTB    TE elbow flexion 2x10, GTB    TE Tricep extension 2x10, GTB    TE 4 way shoulder  2x10, GTB         PLAN          CPT Codes available for Billing:   (15) minutes of Manual therapy (MT) to improve pain and ROM.  (25) minutes of Therapeutic Exercise (TE) to develop strength, endurance, range of " motion, and flexibility.  (15) minutes of Neuromuscular Re-Education (NMR)  to improve: Balance, Coordination, Kinesthetic, Sense, Proprioception, and Posture.  (-) minutes of Therapeutic Activities (TA) to improve functional performance.  Unattended Electrical Stimulation (ES) for muscle performance or pain modulation.  Vasopneumatic Device Therapy () for management of swelling/edema. (32281)  BFR: Blood flow restriction applied during exercise  NP or (-): Not Performed    Patient Education and Home Exercises     Home Exercises Provided and Patient Education Provided     Education provided:   - Continue HEP    Written Home Exercises Provided: Patient instructed to cont prior HEP. Exercises were reviewed and Jeana was able to demonstrate them prior to the end of the session.  Jeana demonstrated good  understanding of the education provided. See EMR under Patient Instructions for exercises provided during therapy sessions    See EMR under Patient Instructions for exercises provided     ASSESSMENT     Patient presents to clinic with mild pain left lower cervical and no pain to left shoulder. Performed dry needling to reduce tension to left shoulder.Overall pt demonstrates good strength and range of motion thus far. Reviewed and provided updated HEP to further strengthen at home. Pt is discharged from my care.     Jeana Is progressing well towards her goals.   Pt prognosis is Good.     Pt will continue to benefit from skilled outpatient physical therapy to address the deficits listed in the problem list box on initial evaluation, provide pt/family education and to maximize pt's level of independence in the home and community environment.     Pt's spiritual, cultural and educational needs considered and pt agreeable to plan of care and goals.     Anticipated barriers to physical therapy: none    Goals:      Short Term Goals:  4 weeks Status    PAIN: Pt will report worst pain of 4/10 in order to progress toward max  functional ability and improve quality of life. [] Progressing  [x] Met  [] Not Met   FUNCTION: Patient will improve functional outcome test score by 5 points  [] Progressing  [x] Met  [] Not Met   MOBILITY: Patient will improve shoulder AROM by 20 degree in order to progress towards independence with functional activities.  [] Progressing  [x] Met  [] Not Met   STRENGTH: Patient will improve strength by 1 grade  in order to progress towards independence with functional activities. [] Progressing  [x] Met  [] Not Met   POSTURE: Patient will correct postural deviations in sitting and standing, to decrease pain and promote long term stability.  [] Progressing  [x] Met  [] Not Met   HEP: Patient will demonstrate independence with HEP in order to progress toward functional independence. [] Progressing  [x] Met  [] Not Met      Long Term Goals:  8 weeks Status   PAIN: Pt will report worst pain of 1/10 in order to progress toward max functional ability and improve quality of life [] Progressing  [x] Met  [] Not Met   FUNCTION: Patient will demonstrate improved function as indicated by an increase of 10 points on FOTO [] Progressing  [x] Met  [] Not Met   MOBILITY: Patient will improve shoulder AROM to WFL in order to return to maximal functional potential and improve quality of life.  [] Progressing  [x] Met  [] Not Met   STRENGTH: Patient will improve strength to 4/5 without pain in order to improve functional independence and quality of life. [] Progressing  [x] Met  [] Not Met   GAIT: Patient will demonstrate normalized gait mechanics with minimal compensation in order to return to PLOF. [] Progressing  [x] Met  [] Not Met   Patient will return to normal ADL's, IADL's, community involvement, recreational activities, and work-related activities with less than or equal to 1/10 pain and maximal function.  [] Progressing  [x] Met  [] Not Met         PLAN     Pt is discharged from physical therapy.      Brandee Hughes, PT

## 2024-08-01 NOTE — PLAN OF CARE
"[facility]   Physical Therapy Discharege/Treatment Note     Name: Jeana Monsivais  Clinic Number: [id number]    Therapy Diagnosis:   Encounter Diagnoses   Name Primary?    Impaired strength of shoulder muscles Yes    Decreased range of motion of left shoulder      Physician: Pilo Rangel MD    Visit Date: 8/1/2024    Physician Orders: PT Eval and Treat   Medical Diagnosis from Referral: Calcific tendonitis of left shoulder [M75.32]   Evaluation Date: 7/2/2024  Authorization Period Expiration: 12/31/2024  Plan of Care Expiration: 8/30/24  Progress Note Due: 8/2/24  Visit # / Visits authorized: 8/20 treatment (+eval)  FOTO: 3/4 (last performed 8/1/2024)     PRECAUTIONS: Standard       MD Follow up: 8/15/24 with Dr. Rangel    PTA Visit #: -/5     Time In: 11:00  Time Out: 12:00  Total Billable Time: 60 minute    SUBJECTIVE     Pt reports: her neck is better. Mild pain still located in the neck. Denies any pain to shoulder and is agreeable for discharge.     She was compliant with home exercise program.  Response to previous treatment: NA  Functional change: NA    Pain: 0/10  Location: shoulder    OBJECTIVE     7/30/24  L shoulder flexion = 4/5  L shoulder abduction = 4+/5  L shoulder IR = 4+/5  L shoulder ER = 4+/5    L Functional IR = T9  L Functional ER = C7    Treatment     Jeana received the treatments listed below:      CPT Intervention  JointFocus Duration / Intensity  7/30   MT manual STM to upper trap    MT  FDN R UT and lateral triceps   TE UT str, B 3x30"    NMR Shoulder rolls  2x10   NMR August shoulder ER 2x10, GTB    NMR Shoulder HSA 2x10, GTB    TE Standing shoulder flexion with cane 2x10, GTB    TE elbow flexion 2x10, GTB    TE Tricep extension 2x10, GTB    TE 4 way shoulder  2x10, GTB         PLAN          CPT Codes available for Billing:   (15) minutes of Manual therapy (MT) to improve pain and ROM.  (25) minutes of Therapeutic Exercise (TE) to develop strength, endurance, " range of motion, and flexibility.  (15) minutes of Neuromuscular Re-Education (NMR)  to improve: Balance, Coordination, Kinesthetic, Sense, Proprioception, and Posture.  (-) minutes of Therapeutic Activities (TA) to improve functional performance.  Unattended Electrical Stimulation (ES) for muscle performance or pain modulation.  Vasopneumatic Device Therapy () for management of swelling/edema. (55637)  BFR: Blood flow restriction applied during exercise  NP or (-): Not Performed    Patient Education and Home Exercises     Home Exercises Provided and Patient Education Provided     Education provided:   - Continue HEP    Written Home Exercises Provided: Patient instructed to cont prior HEP. Exercises were reviewed and Jeana was able to demonstrate them prior to the end of the session.  Jeana demonstrated good  understanding of the education provided. See EMR under Patient Instructions for exercises provided during therapy sessions    See EMR under Patient Instructions for exercises provided     ASSESSMENT     Patient presents to clinic with mild pain left lower cervical and no pain to left shoulder. Performed dry needling to reduce tension to left shoulder.Overall pt demonstrates good strength and range of motion thus far. Reviewed and provided updated HEP to further strengthen at home. Pt is discharged from my care.     Jeana Is progressing well towards her goals.   Pt prognosis is Good.     Pt will continue to benefit from skilled outpatient physical therapy to address the deficits listed in the problem list box on initial evaluation, provide pt/family education and to maximize pt's level of independence in the home and community environment.     Pt's spiritual, cultural and educational needs considered and pt agreeable to plan of care and goals.     Anticipated barriers to physical therapy: none    Goals:      Short Term Goals:  4 weeks Status    PAIN: Pt will report worst pain of 4/10 in order to progress  toward max functional ability and improve quality of life. [] Progressing  [x] Met  [] Not Met   FUNCTION: Patient will improve functional outcome test score by 5 points  [] Progressing  [x] Met  [] Not Met   MOBILITY: Patient will improve shoulder AROM by 20 degree in order to progress towards independence with functional activities.  [] Progressing  [x] Met  [] Not Met   STRENGTH: Patient will improve strength by 1 grade  in order to progress towards independence with functional activities. [] Progressing  [x] Met  [] Not Met   POSTURE: Patient will correct postural deviations in sitting and standing, to decrease pain and promote long term stability.  [] Progressing  [x] Met  [] Not Met   HEP: Patient will demonstrate independence with HEP in order to progress toward functional independence. [] Progressing  [x] Met  [] Not Met      Long Term Goals:  8 weeks Status   PAIN: Pt will report worst pain of 1/10 in order to progress toward max functional ability and improve quality of life [] Progressing  [x] Met  [] Not Met   FUNCTION: Patient will demonstrate improved function as indicated by an increase of 10 points on FOTO [] Progressing  [x] Met  [] Not Met   MOBILITY: Patient will improve shoulder AROM to WFL in order to return to maximal functional potential and improve quality of life.  [] Progressing  [x] Met  [] Not Met   STRENGTH: Patient will improve strength to 4/5 without pain in order to improve functional independence and quality of life. [] Progressing  [x] Met  [] Not Met   GAIT: Patient will demonstrate normalized gait mechanics with minimal compensation in order to return to PLOF. [] Progressing  [x] Met  [] Not Met   Patient will return to normal ADL's, IADL's, community involvement, recreational activities, and work-related activities with less than or equal to 1/10 pain and maximal function.  [] Progressing  [x] Met  [] Not Met         PLAN     Pt is discharged from physical therapy.      Brandee  Ellen, REGINALD

## 2024-08-15 ENCOUNTER — OFFICE VISIT (OUTPATIENT)
Dept: ORTHOPEDICS | Facility: CLINIC | Age: 74
End: 2024-08-15
Payer: MEDICARE

## 2024-08-15 VITALS — BODY MASS INDEX: 39.28 KG/M2 | WEIGHT: 250.25 LBS | HEIGHT: 67 IN

## 2024-08-15 DIAGNOSIS — M75.32 CALCIFIC TENDINITIS OF LEFT SHOULDER: Primary | ICD-10-CM

## 2024-08-15 PROCEDURE — 99999 PR PBB SHADOW E&M-EST. PATIENT-LVL III: CPT | Mod: PBBFAC,,, | Performed by: STUDENT IN AN ORGANIZED HEALTH CARE EDUCATION/TRAINING PROGRAM

## 2024-08-15 PROCEDURE — 99213 OFFICE O/P EST LOW 20 MIN: CPT | Mod: PBBFAC,PO | Performed by: STUDENT IN AN ORGANIZED HEALTH CARE EDUCATION/TRAINING PROGRAM

## 2024-08-15 PROCEDURE — 99499 UNLISTED E&M SERVICE: CPT | Mod: S$PBB,,, | Performed by: STUDENT IN AN ORGANIZED HEALTH CARE EDUCATION/TRAINING PROGRAM

## 2024-08-15 NOTE — PROGRESS NOTES
Patient ID: Jeana Monsivais  YOB: 1950  MRN: [id number]    Chief Complaint: Post-op Evaluation of the Left Shoulder      History of Present Illness: Jenaa Monsivais is a right-hand dominant 73 y.o. female who is here for follow up evaluation of Left calcific shoulder.     Last Appointment:  06/19/2024  Diagnosis:  calcific tendinitis left shoulder  Prior Procedure:  TENJET  PT Location:  [facility]    The patient returns today reporting that the symptoms  improved range of motion and pain and is interested in proceeding with  conservative options.      Doing very well status post TENJET 6 weeks out from her procedure.  No pain with any range of motion or activity or at rest.  Happy with the improvement she has made the only time that she has some pain associated over lateral shoulders with band work.  She feels like she is doing too many repetitions and after while her shoulder will become bothersome.  Besides that has not noticed any weakness or strength deficits.    Past Medical History:   No past medical history on file.  Past Surgical History:   Procedure Laterality Date    BREAST LUMPECTOMY Left 2019    CARPAL TUNNEL RELEASE Right 4/19/2024    Procedure: RELEASE, CARPAL TUNNEL;  Surgeon: Lela Garces MD;  Location: [facility];  Service: Orthopedics;  Laterality: Right;  LOCAL ONLY    HYSTERECTOMY  1997    kyphoplasty       Family History   Problem Relation Name Age of Onset    Heart failure Mother      Heart failure Father      Heart attack Father      Cardiomyopathy Sister      Hypertrophic cardiomyopathy Sister      Hypertrophic cardiomyopathy Brother      Cardiomyopathy Brother       Social History     Socioeconomic History    Marital status:    Tobacco Use    Smoking status: Never     Passive exposure: Past    Smokeless tobacco: Never   Substance and Sexual Activity    Alcohol use: Not Currently    Drug use: Never    Sexual activity: Not Currently     Social Determinants of  Health     Financial Resource Strain: Medium Risk (5/28/2024)    Received from [facility] and Its Subsidiaries and Affiliates    Overall Financial Resource Strain (CARDIA)     Difficulty of Paying Living Expenses: Somewhat hard   Food Insecurity: Food Insecurity Present (5/28/2024)    Received from [facility] and Its Subsidiaries and Affiliates    Hunger Vital Sign     Worried About Running Out of Food in the Last Year: Sometimes true     Ran Out of Food in the Last Year: Never true   Transportation Needs: No Transportation Needs (7/15/2024)    Received from [facility]    PRAPARE - Transportation     Lack of Transportation (Medical): No     Lack of Transportation (Non-Medical): No   Physical Activity: Sufficiently Active (7/15/2024)    Received from [facility]    Exercise Vital Sign     Days of Exercise per Week: 7 days     Minutes of Exercise per Session: 50 min   Recent Concern: Physical Activity - Insufficiently Active (5/28/2024)    Received from [facility] and Its Subsidiaries and Affiliates    Exercise Vital Sign     Days of Exercise per Week: 2 days     Minutes of Exercise per Session: 30 min   Stress: No Stress Concern Present (5/28/2024)    Received from [facility] Its Subsidiaries and Affiliates    [employer] - Occupational Stress Questionnaire     Feeling of Stress : Only a little   Housing Stability: Unknown (7/15/2024)    Received from [facility]    Housing Stability Vital Sign     Unable to Pay for Housing in the Last Year: No     Homeless in the Last Year: No     Medication List with Changes/Refills   Current Medications    ACETAMINOPHEN (TYLENOL) 500 MG TABLET    Take 1 tablet (500 mg total) by mouth every 8 (eight) hours as needed for Pain.    ANASTROZOLE (ARIMIDEX) 1 MG TAB    Take 1 mg by  mouth once daily.    CETIRIZINE (ZYRTEC) 10 MG TABLET    Take 10 mg by mouth once daily.    CYANOCOBALAMIN 1,000 MCG/ML INJECTION    Inject 1,000 mcg into the muscle every 30 days.    CYCLOBENZAPRINE (FLEXERIL) 5 MG TABLET    Take 5 mg by mouth 2 (two) times daily as needed.    DICLOFENAC SODIUM (VOLTAREN) 1 % GEL    Apply 2 g topically 3 (three) times daily.    ERGOCALCIFEROL (ERGOCALCIFEROL) 50,000 UNIT CAP    Take 50,000 Units by mouth every 7 days.    EZETIMIBE (ZETIA) 10 MG TABLET    Take 10 mg by mouth once daily.    FENOFIBRATE 160 MG TAB    Take 160 mg by mouth once daily.    FEXOFENADINE (ALLEGRA) 180 MG TABLET    Take 180 mg by mouth once daily.    HYDROCHLOROTHIAZIDE (HYDRODIURIL) 25 MG TABLET    Take 25 mg by mouth once daily.    LISINOPRIL 10 MG TABLET    Take 10 mg by mouth once daily.    NAPROXEN (NAPROSYN) 500 MG TABLET    Take 1 tablet (500 mg total) by mouth every 8 (eight) hours as needed (Pain). Take with food    OLOPATADINE (PATANOL) 0.1 % OPHTHALMIC SOLUTION    Place 1 drop into both eyes 2 (two) times daily.    TRAMADOL (ULTRAM) 50 MG TABLET    Take 1 tablet (50 mg total) by mouth every 8 (eight) hours as needed (Breakthrough pain).    TRAMADOL (ULTRAM) 50 MG TABLET    Take 1 tablet (50 mg total) by mouth every 6 (six) hours as needed for Pain.    VITAMINS  A,C,E-ZINC-COPPER (PRESERVISION AREDS) [id number] UNIT-MG-UNIT CAP    Take 1 tablet by mouth once a week.     Review of patient's allergies indicates:   Allergen Reactions    Mycinette [cetylpyridinium-benzocaine] Diarrhea    Erythromycin Other (See Comments)     Upset stomach    Levaquin [levofloxacin] Nausea And Vomiting       Physical Exam:   There is no height or weight on file to calculate BMI.    GENERAL: Well appearing, in no acute distress.  HEAD: Normocephalic and atraumatic.  ENT: External ears and nose grossly normal.  EYES: EOMI bilaterally  PULMONARY: Respirations are grossly even and non-labored.  NEURO: Awake, alert, and  oriented x 3.  SKIN: No obvious rashes appreciated.  PSYCH: Mood & affect are appropriate.    Detailed MSK exam:       Left shoulder exam full range of motion flexion-extension external internal rotation.  No pain and good strength with resisted external rotation negative empty can mildly positive full can wear the reproduction symptoms over the lateral shoulder.  Negative Speed's negative Gregg's.  Negative drop arm no painful arc motion noted    Imaging:  X-Ray Hand Complete Right  Narrative: EXAMINATION:  XR HAND COMPLETE 3 VIEW RIGHT    CLINICAL HISTORY:  Pain in right hand    TECHNIQUE:  PA, lateral, and oblique views of the right hand were performed.    COMPARISON:  None    FINDINGS:  No acute abnormality with mild OA changes.  Impression: As above    Electronically signed by: Aguilar Saul MD  Date:    03/20/2024  Time:    13:05    Relevant imaging results were reviewed and interpreted by me and per my read  as above.  This was discussed with the patient and / or family today.     Assessment:  Jeana Monsivais is a 73 y.o. female  presents today 6 week follow-up calcific tendinitis left shoulder status post TENJET.  Dramatic improvements in range of motion and pain.  Still has some mild strength deficits that I noted will continue improve.  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  We will decrease the amount of repetitions she is doing with the band work did discuss the importance of continuing with her strengthening work over time.  We will follow up with me as needed in the future.    Calcific tendinitis of left shoulder           Pilo Rangel MD    Disclaimer: This note was prepared using a voice recognition system and is likely to have sound alike errors within the text.

## 2024-10-01 ENCOUNTER — TELEPHONE (OUTPATIENT)
Dept: SPORTS MEDICINE | Facility: CLINIC | Age: 74
End: 2024-10-01
Payer: COMMERCIAL

## 2024-10-01 NOTE — TELEPHONE ENCOUNTER
----- Message from Eileen sent at 10/1/2024 11:28 AM CDT -----  Contact: Jeana Hills needs a call back at  [phone], Regards to getting her post op reschedule.    Thanks  Td

## 2025-04-10 ENCOUNTER — OFFICE VISIT (OUTPATIENT)
Dept: ORTHOPEDICS | Facility: CLINIC | Age: 75
End: 2025-04-10
Payer: MEDICARE

## 2025-04-10 DIAGNOSIS — M17.11 PRIMARY OSTEOARTHRITIS OF RIGHT KNEE: Primary | ICD-10-CM

## 2025-04-10 PROCEDURE — 99213 OFFICE O/P EST LOW 20 MIN: CPT | Mod: PBBFAC,PO | Performed by: STUDENT IN AN ORGANIZED HEALTH CARE EDUCATION/TRAINING PROGRAM

## 2025-04-10 PROCEDURE — 99999 PR PBB SHADOW E&M-EST. PATIENT-LVL III: CPT | Mod: PBBFAC,,, | Performed by: STUDENT IN AN ORGANIZED HEALTH CARE EDUCATION/TRAINING PROGRAM

## 2025-04-10 PROCEDURE — 99214 OFFICE O/P EST MOD 30 MIN: CPT | Mod: S$PBB,,, | Performed by: STUDENT IN AN ORGANIZED HEALTH CARE EDUCATION/TRAINING PROGRAM

## 2025-04-10 PROCEDURE — G2211 COMPLEX E/M VISIT ADD ON: HCPCS | Mod: S$PBB,,, | Performed by: STUDENT IN AN ORGANIZED HEALTH CARE EDUCATION/TRAINING PROGRAM

## 2025-04-10 NOTE — PROGRESS NOTES
Patient ID: Jeana Monsivais  YOB: 1950  MRN: [id number]    Chief Complaint: Pain of the Right Knee      History of Present Illness: Jeana Monsivais is a 74-year-old female presenting today for primary arthritis of the right knee.  Did well with Euflexxa almost a year ago now.  Requesting repeat injections today.  No new injury fall trauma pain worsening again similar spot as previous.    Past Medical History:   History reviewed. No pertinent past medical history.  Past Surgical History:   Procedure Laterality Date    BREAST LUMPECTOMY Left 2019    CARPAL TUNNEL RELEASE Right 4/19/2024    Procedure: RELEASE, CARPAL TUNNEL;  Surgeon: Lela Garces MD;  Location: [facility];  Service: Orthopedics;  Laterality: Right;  LOCAL ONLY    HYSTERECTOMY  1997    kyphoplasty       Family History   Problem Relation Name Age of Onset    Heart failure Mother      Heart failure Father      Heart attack Father      Cardiomyopathy Sister      Hypertrophic cardiomyopathy Sister      Hypertrophic cardiomyopathy Brother      Cardiomyopathy Brother       Social History[1]  Medication List with Changes/Refills   Current Medications    ACETAMINOPHEN (TYLENOL) 500 MG TABLET    Take 1 tablet (500 mg total) by mouth every 8 (eight) hours as needed for Pain.    ANASTROZOLE (ARIMIDEX) 1 MG TAB    Take 1 mg by mouth once daily.    CETIRIZINE (ZYRTEC) 10 MG TABLET    Take 10 mg by mouth once daily.    CYANOCOBALAMIN 1,000 MCG/ML INJECTION    Inject 1,000 mcg into the muscle every 30 days.    CYCLOBENZAPRINE (FLEXERIL) 5 MG TABLET    Take 5 mg by mouth 2 (two) times daily as needed.    DICLOFENAC SODIUM (VOLTAREN) 1 % GEL    Apply 2 g topically 3 (three) times daily.    ERGOCALCIFEROL (ERGOCALCIFEROL) 50,000 UNIT CAP    Take 50,000 Units by mouth every 7 days.    EZETIMIBE (ZETIA) 10 MG TABLET    Take 10 mg by mouth once daily.    FENOFIBRATE 160 MG TAB    Take 160 mg by mouth once daily.    FEXOFENADINE (ALLEGRA) 180 MG TABLET     Take 180 mg by mouth once daily.    HYDROCHLOROTHIAZIDE (HYDRODIURIL) 25 MG TABLET    Take 25 mg by mouth once daily.    LISINOPRIL 10 MG TABLET    Take 10 mg by mouth once daily.    NAPROXEN (NAPROSYN) 500 MG TABLET    Take 1 tablet (500 mg total) by mouth every 8 (eight) hours as needed (Pain). Take with food    OLOPATADINE (PATANOL) 0.1 % OPHTHALMIC SOLUTION    Place 1 drop into both eyes 2 (two) times daily.    TRAMADOL (ULTRAM) 50 MG TABLET    Take 1 tablet (50 mg total) by mouth every 8 (eight) hours as needed (Breakthrough pain).    TRAMADOL (ULTRAM) 50 MG TABLET    Take 1 tablet (50 mg total) by mouth every 6 (six) hours as needed for Pain.    VITAMINS  A,C,E-ZINC-COPPER (PRESERVISION AREDS) 14,320-226-200 UNIT-MG-UNIT CAP    Take 1 tablet by mouth once a week.     Review of patient's allergies indicates:   Allergen Reactions    Mycinette [cetylpyridinium-benzocaine] Diarrhea    Erythromycin Other (See Comments)     Upset stomach    Levaquin [levofloxacin] Nausea And Vomiting       Physical Exam:   There is no height or weight on file to calculate BMI.    GENERAL: Well appearing, in no acute distress.  HEAD: Normocephalic and atraumatic.  ENT: External ears and nose grossly normal.  EYES: EOMI bilaterally  PULMONARY: Respirations are grossly even and non-labored.  NEURO: Awake, alert, and oriented x 3.  SKIN: No obvious rashes appreciated.  PSYCH: Mood & affect are appropriate.    Detailed MSK exam:     Tenderness over medial joint line slightly antalgic gait.  No effusion appreciated crepitance active range of motion.    Imaging:    No new imaging    Assessment:  Jeana Monsivais is a 74 y.o. female presents today for recurrent primary arthritis of the right knee and symptoms returning now almost a year following Visco injections.  She is a great candidate to continue with Visco injections that she has had almost a year relief.  We will move forward 3 shot series at [facility] all questions answered today.   Follow-up for Visco    MEDICAL NECESSITY FOR VISCOSUPPLEMETNATION: After thorough evaluation of the patient, I have determined that visco-supplementation is medically necessary. The patient has painful degenerative changes of the knee with failure of conservative treatments including lifestyle modifications and rehabilitation exercises.  Oral analgesis/NSAIDs have not adequately controlled symptoms and there is radiographic evidence of Kellgren Son grade 2 or greater osteoarthritic changes, or in lack of radiographic evidence, there is arthroscopic or other evidence of chondrosis.     Today's visit is associated with current or anticipated ongoing medical care related to this patient's diagnosis of osteoarthritis.  Currently there is no cure of osteoarthritis and the patient will require regular follow up to manage symptoms and progression.  The patient is to return to the office within a minimum of 3-6 months to review symptoms and function at that time.   CPT code       There are no diagnoses linked to this encounter.       Pilo Rangel MD    Disclaimer: This note was prepared using a voice recognition system and is likely to have sound alike errors within the text.          [1]   Social History  Socioeconomic History    Marital status:    Tobacco Use    Smoking status: Never     Passive exposure: Past    Smokeless tobacco: Never   Substance and Sexual Activity    Alcohol use: Not Currently    Drug use: Never    Sexual activity: Not Currently     Social Drivers of Health     Financial Resource Strain: Medium Risk (5/28/2024)    Received from [facility] and Its Subsidiaries and Affiliates    Overall Financial Resource Strain (CARDIA)     Difficulty of Paying Living Expenses: Somewhat hard   Food Insecurity: Food Insecurity Present (5/28/2024)    Received from [facility] and [facility] Subsidiaries and Aff[facility]ates    Hunger Vital  Sign     Worried About Running Out of Food in the Last Year: Sometimes true     Ran Out of Food in the Last Year: Never true   Transportation Needs: No Transportation Needs (7/15/2024)    Received from [facility]    PRAPARE - Transportation     Lack of Transportation (Medical): No     Lack of Transportation (Non-Medical): No   Physical Activity: Sufficiently Active (7/15/2024)    Received from [facility]    Exercise Vital Sign     Days of Exercise per Week: 7 days     Minutes of Exercise per Session: 50 min   Recent Concern: Physical Activity - Insufficiently Active (5/28/2024)    Received from [facility] and Its Subsidiaries and Affiliates    Exercise Vital Sign     Days of Exercise per Week: 2 days     Minutes of Exercise per Session: 30 min   Stress: No Stress Concern Present (5/28/2024)    Received from [facility] and Its Subsidiaries and Affiliates    [employer] - Occupational Stress Questionnaire     Feeling of Stress : Only a little   Housing Stability: Unknown (7/15/2024)    Received from [facility]    Housing Stability Vital Sign     Unable to Pay for Housing in the Last Year: No     Homeless in the Last Year: No

## 2025-05-15 ENCOUNTER — PATIENT MESSAGE (OUTPATIENT)
Dept: SPORTS MEDICINE | Facility: CLINIC | Age: 75
End: 2025-05-15
Payer: COMMERCIAL

## 2025-05-15 ENCOUNTER — OFFICE VISIT (OUTPATIENT)
Dept: ORTHOPEDICS | Facility: CLINIC | Age: 75
End: 2025-05-15
Payer: MEDICARE

## 2025-05-15 DIAGNOSIS — M17.11 PRIMARY OSTEOARTHRITIS OF RIGHT KNEE: Primary | ICD-10-CM

## 2025-05-15 PROCEDURE — 99999PBSHW PR PBB SHADOW TECHNICAL ONLY FILED TO HB: Mod: JZ,PBBFAC,,

## 2025-05-15 PROCEDURE — 20610 DRAIN/INJ JOINT/BURSA W/O US: CPT | Mod: PBBFAC,PO,RT | Performed by: STUDENT IN AN ORGANIZED HEALTH CARE EDUCATION/TRAINING PROGRAM

## 2025-05-15 NOTE — PROCEDURES
Large Joint Aspiration/Injection: R knee    Date/Time: 5/15/2025 12:00 PM    Performed by: Pilo Rangel MD  Authorized by: Pilo Rangel MD    Consent Done?:  Yes (Verbal)  Indications:  Pain  Site marked: the procedure site was marked    Timeout: prior to procedure the correct patient, procedure, and site was verified    Prep: patient was prepped and draped in usual sterile fashion      Local anesthesia used?: Yes    Local anesthetic:  Topical anesthetic    Details:  Needle Size:  22 G  Ultrasonic Guidance for needle placement?: No    Approach: Superior lateral.  Location:  Knee  Site:  R knee  Medications:  20 mg sodium hyaluronate (EUFLEXXA) 10 mg/mL(mw 2.4 -3.6 million)  Patient tolerance:  Patient tolerated the procedure well with no immediate complications     We discussed the proper protocols after the injection such as no submerging pools, baths tubs, or hot tubs for 24 hr.  Showering is okay today.  We discussed red flags such as fevers, chills, red, warm, tender joint at the area of injection to please seek medical care immediately.      MEDICAL NECESSITY FOR VISCOSUPPLEMETNATION: After thorough evaluation of the patient, I have determined that visco-supplementation is medically necessary. The patient has painful degenerative changes of the knee with failure of conservative treatments including lifestyle modifications and rehabilitation exercises.  Oral analgesis/NSAIDs have not adequately controlled symptoms and there is radiographic evidence of Kellgren Son grade 2 or greater osteoarthritic changes, or in lack of radiographic evidence, there is arthroscopic or other evidence of chondrosis.     Euflexxa:  right knee 1/3

## 2025-05-22 ENCOUNTER — OFFICE VISIT (OUTPATIENT)
Dept: ORTHOPEDICS | Facility: CLINIC | Age: 75
End: 2025-05-22
Payer: MEDICARE

## 2025-05-22 DIAGNOSIS — M17.11 PRIMARY OSTEOARTHRITIS OF RIGHT KNEE: Primary | ICD-10-CM

## 2025-05-22 PROCEDURE — 20611 DRAIN/INJ JOINT/BURSA W/US: CPT | Mod: PBBFAC,PO | Performed by: STUDENT IN AN ORGANIZED HEALTH CARE EDUCATION/TRAINING PROGRAM

## 2025-05-22 PROCEDURE — 99999PBSHW PR PBB SHADOW TECHNICAL ONLY FILED TO HB: Mod: JZ,PBBFAC,,

## 2025-05-22 NOTE — PROCEDURES
Large Joint Aspiration/Injection: R knee    Date/Time: 5/22/2025 12:00 PM    Performed by: Pilo Rangel MD  Authorized by: Pilo Rangel MD    Consent Done?:  Yes (Verbal)  Indications:  Pain  Site marked: the procedure site was marked    Timeout: prior to procedure the correct patient, procedure, and site was verified    Prep: patient was prepped and draped in usual sterile fashion      Local anesthesia used?: Yes    Local anesthetic:  Topical anesthetic    Details:  Needle Size:  22 G  Ultrasonic Guidance for needle placement?: Yes    Images are saved and documented.  Approach: Superior lateral.  Location:  Knee  Site:  R knee  Medications:  20 mg sodium hyaluronate (EUFLEXXA) 10 mg/mL(mw 2.4 -3.6 million)  Patient tolerance:  Patient tolerated the procedure well with no immediate complications     Ultrasound guidance was used for needle localization. Images were saved and stored for documentation. The appropriate structures were visualized. Dynamic visualization of the needle was continuous throughout the procedures and maintained good position.     We discussed the proper protocols after the injection such as no submerging pools, baths tubs, or hot tubs for 24 hr.  Showering is okay today.  We discussed red flags such as fevers, chills, red, warm, tender joint at the area of injection to please seek medical care immediately.      MEDICAL NECESSITY FOR VISCOSUPPLEMETNATION: After thorough evaluation of the patient, I have determined that visco-supplementation is medically necessary. The patient has painful degenerative changes of the knee with failure of conservative treatments including lifestyle modifications and rehabilitation exercises.  Oral analgesis/NSAIDs have not adequately controlled symptoms and there is radiographic evidence of Kellgren Son grade 2 or greater osteoarthritic changes, or in lack of radiographic evidence, there is arthroscopic or other evidence of chondrosis.     Euflexxa:    Right knee 2/3

## 2025-05-29 ENCOUNTER — OFFICE VISIT (OUTPATIENT)
Dept: ORTHOPEDICS | Facility: CLINIC | Age: 75
End: 2025-05-29
Payer: MEDICARE

## 2025-05-29 DIAGNOSIS — M17.11 PRIMARY OSTEOARTHRITIS OF RIGHT KNEE: Primary | ICD-10-CM

## 2025-05-29 PROCEDURE — 99999PBSHW PR PBB SHADOW TECHNICAL ONLY FILED TO HB: Mod: JZ,PBBFAC,,

## 2025-05-29 PROCEDURE — 20610 DRAIN/INJ JOINT/BURSA W/O US: CPT | Mod: PBBFAC,PO,RT | Performed by: STUDENT IN AN ORGANIZED HEALTH CARE EDUCATION/TRAINING PROGRAM

## 2025-05-29 NOTE — PROCEDURES
Large Joint Aspiration/Injection: R knee    Date/Time: 5/29/2025 12:00 PM    Performed by: Pilo Rangel MD  Authorized by: Pilo Rangel MD    Consent Done?:  Yes (Verbal)  Indications:  Pain  Site marked: the procedure site was marked    Timeout: prior to procedure the correct patient, procedure, and site was verified    Prep: patient was prepped and draped in usual sterile fashion      Local anesthesia used?: Yes    Local anesthetic:  Topical anesthetic    Details:  Needle Size:  22 G  Ultrasonic Guidance for needle placement?: No    Approach: Superior lateral.  Location:  Knee  Site:  R knee  Medications:  20 mg sodium hyaluronate (EUFLEXXA) 10 mg/mL(mw 2.4 -3.6 million)  Patient tolerance:  Patient tolerated the procedure well with no immediate complications       We discussed the proper protocols after the injection such as no submerging pools, baths tubs, or hot tubs for 24 hr.  Showering is okay today.  We discussed red flags such as fevers, chills, red, warm, tender joint at the area of injection to please seek medical care immediately.      MEDICAL NECESSITY FOR VISCOSUPPLEMETNATION: After thorough evaluation of the patient, I have determined that visco-supplementation is medically necessary. The patient has painful degenerative changes of the knee with failure of conservative treatments including lifestyle modifications and rehabilitation exercises.  Oral analgesis/NSAIDs have not adequately controlled symptoms and there is radiographic evidence of Kellgren Son grade 2 or greater osteoarthritic changes, or in lack of radiographic evidence, there is arthroscopic or other evidence of chondrosis.     Euflexxa:  Right knee 3/3 follow-up 5 months with Dr. Hamilton

## (undated) DEVICE — NDL SAFETY 22G X 1.5 ECLIPSE

## (undated) DEVICE — SYR 10CC LUER LOCK

## (undated) DEVICE — DRESSING N ADH OIL EMUL 3X3

## (undated) DEVICE — ALCOHOL 70% ANTISEPTIC ISO 4OZ

## (undated) DEVICE — BNDG COFLEX FOAM LF2 ST 3X5YD

## (undated) DEVICE — SOL 9P NACL IRR PIC IL

## (undated) DEVICE — SPONGE GAUZE 4X4 12 PLY STRL

## (undated) DEVICE — BANDAGE ESMARK ELASTIC ST 4X9

## (undated) DEVICE — GOWN FAB REINF SET-IN SLV LG

## (undated) DEVICE — DRAPE HAND STERILE

## (undated) DEVICE — GLOVE SURGEONS ULTRA TOUCH 6.5

## (undated) DEVICE — DRAPE STERI-DRAPE 1000 17X11IN

## (undated) DEVICE — KIT TURNOVER

## (undated) DEVICE — DRAPE U SPLIT SHEET 54X76IN

## (undated) DEVICE — POSITIONER HEAD DONUT 9IN FOAM

## (undated) DEVICE — DRAPE THREE-QTR REINF 53X77IN

## (undated) DEVICE — PACK BASIC SETUP SC BR

## (undated) DEVICE — TOWEL OR DISP STRL BLUE 4/PK

## (undated) DEVICE — APPLICATOR CHLORAPREP ORN 26ML

## (undated) DEVICE — SUPPORT ULNA NERVE PROTECTOR

## (undated) DEVICE — TOURNIQUET SB QC DP 18X4IN

## (undated) DEVICE — UNDERGLOVES BIOGEL PI SZ 7 LF

## (undated) DEVICE — SUT 4-0 ETHILON 18 PS-2

## (undated) DEVICE — PADDING 4X4YD SPECIALIST100

## (undated) DEVICE — COVER CAMERA OPERATING ROOM

## (undated) DEVICE — COVER LIGHT HANDLE 80/CA